# Patient Record
Sex: FEMALE | Race: WHITE | Employment: FULL TIME | URBAN - METROPOLITAN AREA
[De-identification: names, ages, dates, MRNs, and addresses within clinical notes are randomized per-mention and may not be internally consistent; named-entity substitution may affect disease eponyms.]

---

## 2020-12-21 ENCOUNTER — OFFICE VISIT (OUTPATIENT)
Dept: OBGYN CLINIC | Facility: CLINIC | Age: 30
End: 2020-12-21
Payer: COMMERCIAL

## 2020-12-21 VITALS
HEIGHT: 61.5 IN | HEART RATE: 72 BPM | DIASTOLIC BLOOD PRESSURE: 70 MMHG | SYSTOLIC BLOOD PRESSURE: 118 MMHG | BODY MASS INDEX: 29.79 KG/M2 | WEIGHT: 159.81 LBS

## 2020-12-21 DIAGNOSIS — Z01.419 WELL WOMAN EXAM WITH ROUTINE GYNECOLOGICAL EXAM: Primary | ICD-10-CM

## 2020-12-21 DIAGNOSIS — Z30.41 ENCOUNTER FOR SURVEILLANCE OF CONTRACEPTIVE PILLS: ICD-10-CM

## 2020-12-21 DIAGNOSIS — Z12.4 SCREENING FOR MALIGNANT NEOPLASM OF CERVIX: ICD-10-CM

## 2020-12-21 PROCEDURE — 99385 PREV VISIT NEW AGE 18-39: CPT | Performed by: OBSTETRICS & GYNECOLOGY

## 2020-12-21 PROCEDURE — 88175 CYTOPATH C/V AUTO FLUID REDO: CPT | Performed by: OBSTETRICS & GYNECOLOGY

## 2020-12-21 PROCEDURE — 3074F SYST BP LT 130 MM HG: CPT | Performed by: OBSTETRICS & GYNECOLOGY

## 2020-12-21 PROCEDURE — 3008F BODY MASS INDEX DOCD: CPT | Performed by: OBSTETRICS & GYNECOLOGY

## 2020-12-21 PROCEDURE — 3078F DIAST BP <80 MM HG: CPT | Performed by: OBSTETRICS & GYNECOLOGY

## 2020-12-21 RX ORDER — DESOGESTREL AND ETHINYL ESTRADIOL 0.15-0.03
1 KIT ORAL DAILY
COMMUNITY
Start: 2020-12-07 | End: 2020-12-21

## 2020-12-21 RX ORDER — DESOGESTREL AND ETHINYL ESTRADIOL 0.15-0.03
1 KIT ORAL DAILY
Qty: 84 TABLET | Refills: 3 | Status: SHIPPED | OUTPATIENT
Start: 2020-12-21 | End: 2021-09-29

## 2020-12-21 NOTE — PROGRESS NOTES
GYN H&P     2020  11:53 AM    CC: Patient is here for annual exam    HPI: patient is a 35year old  here for her annual exam  She reports she is doing well. She just moved here from [place]. Is a nurse (currently a school nurse).      Delivered a mass, no nipple discharge, no skin change and no tenderness. Left breast exhibits no inverted nipple, no mass, no nipple discharge, no skin change and no tenderness. Breasts are symmetrical.   Abdominal: Soft. Normal appearance.  She exhibits no distension

## 2021-09-01 ENCOUNTER — TELEPHONE (OUTPATIENT)
Dept: OBGYN CLINIC | Facility: CLINIC | Age: 31
End: 2021-09-01

## 2021-09-01 DIAGNOSIS — O20.9 BLEEDING IN EARLY PREGNANCY: Primary | ICD-10-CM

## 2021-09-01 NOTE — TELEPHONE ENCOUNTER
Patient calling to initiate prenatal care  LMP 7/24/21  Patient is 7-8 weeks on   Confirmation Ultrasound and Appointment scheduled on   Future Appointments   Date Time Provider Lona Dorado   9/29/2021  8:15 AM [facility] ERIN VALENCIA [facility] N [facility] [place]

## 2021-09-02 NOTE — TELEPHONE ENCOUNTER
LMP: 21  EDC based on lmp: 22    8 wks on ; appt on  at 9 4/7 wks        Are cycles regular?: yes, 31-35 day cycle    Medical problems: gestational HTN- no problems after delivery    Past sx hx: wisdom teeth removal    Current medica

## 2021-09-28 NOTE — PATIENT INSTRUCTIONS
Foods to Avoid During Pregnancy  • Deli Meats- You may eat deli meats from the deli. If you eat deli meats in the package, it may be contaminated with Listeria.  Heat all deli meats in a package to 165 degrees Fahrenheit before eating, even if the labe Annusol, Sitz bath or Witch hazel  Eat a high fiber diet and drink plenty of fluids. Yeast: Monistat 3 or 7  Call if your symptoms do not improve, or if you experience vaginal bleeding, or a watery discharge.     Constipation: Metamucil, Colace, fiber shafer

## 2021-09-28 NOTE — PROGRESS NOTES
[facility]  Obstetrics and Gynecology    Subjective:     Chana Monroe is a 35year old  female presents with c/o secondary amenorrhea and positive pregnancy test. The patient was recommended to return for further evaluation.  The week to confirm diagnosis.     Reviewed and electronically signed by: Eloisa Kaur MD, 21, 9:23 AM       Assessment:     Tiffany Saavedra is a 35year old  female who presents for secondary amenorrhea and positive pregnancy test    Laretta Runner

## 2021-09-29 ENCOUNTER — OFFICE VISIT (OUTPATIENT)
Dept: OBGYN CLINIC | Facility: CLINIC | Age: 31
End: 2021-09-29
Payer: COMMERCIAL

## 2021-09-29 ENCOUNTER — LAB ENCOUNTER (OUTPATIENT)
Dept: LAB | Age: 31
End: 2021-09-29
Attending: OBSTETRICS & GYNECOLOGY
Payer: COMMERCIAL

## 2021-09-29 ENCOUNTER — ULTRASOUND ENCOUNTER (OUTPATIENT)
Dept: OBGYN CLINIC | Facility: CLINIC | Age: 31
End: 2021-09-29
Payer: COMMERCIAL

## 2021-09-29 VITALS
SYSTOLIC BLOOD PRESSURE: 118 MMHG | BODY MASS INDEX: 29.64 KG/M2 | WEIGHT: 159 LBS | DIASTOLIC BLOOD PRESSURE: 68 MMHG | HEIGHT: 61.5 IN

## 2021-09-29 DIAGNOSIS — N91.2 AMENORRHEA: ICD-10-CM

## 2021-09-29 DIAGNOSIS — Z32.01 PREGNANCY EXAMINATION OR TEST, POSITIVE RESULT: ICD-10-CM

## 2021-09-29 DIAGNOSIS — N91.2 AMENORRHEA: Primary | ICD-10-CM

## 2021-09-29 PROCEDURE — 3074F SYST BP LT 130 MM HG: CPT | Performed by: OBSTETRICS & GYNECOLOGY

## 2021-09-29 PROCEDURE — 85025 COMPLETE CBC W/AUTO DIFF WBC: CPT

## 2021-09-29 PROCEDURE — 86900 BLOOD TYPING SEROLOGIC ABO: CPT

## 2021-09-29 PROCEDURE — 3008F BODY MASS INDEX DOCD: CPT | Performed by: OBSTETRICS & GYNECOLOGY

## 2021-09-29 PROCEDURE — 86850 RBC ANTIBODY SCREEN: CPT

## 2021-09-29 PROCEDURE — 76830 TRANSVAGINAL US NON-OB: CPT | Performed by: OBSTETRICS & GYNECOLOGY

## 2021-09-29 PROCEDURE — 3078F DIAST BP <80 MM HG: CPT | Performed by: OBSTETRICS & GYNECOLOGY

## 2021-09-29 PROCEDURE — 36415 COLL VENOUS BLD VENIPUNCTURE: CPT

## 2021-09-29 PROCEDURE — 76856 US EXAM PELVIC COMPLETE: CPT | Performed by: OBSTETRICS & GYNECOLOGY

## 2021-09-29 PROCEDURE — 86901 BLOOD TYPING SEROLOGIC RH(D): CPT

## 2021-09-29 PROCEDURE — 84702 CHORIONIC GONADOTROPIN TEST: CPT

## 2021-09-29 PROCEDURE — 99213 OFFICE O/P EST LOW 20 MIN: CPT | Performed by: OBSTETRICS & GYNECOLOGY

## 2021-09-29 NOTE — PROGRESS NOTES
A-, antibody screen negative. Therefore, I would advise RhoGam when patient begins vaginal bleeding.

## 2021-10-02 ENCOUNTER — MOBILE ENCOUNTER (OUTPATIENT)
Dept: OBGYN CLINIC | Facility: CLINIC | Age: 31
End: 2021-10-02

## 2021-10-02 NOTE — PROGRESS NOTES
Contacted patient. Patient reports vaginal bleeding. She states bleeding is small in amount but one small clot passed. Patient advised to complete repeat pregnancy reviewed.  Her ultrasound was concerning for possible missed miscarriage and advised to follo

## 2021-10-04 ENCOUNTER — NURSE ONLY (OUTPATIENT)
Dept: OBGYN CLINIC | Facility: CLINIC | Age: 31
End: 2021-10-04
Payer: COMMERCIAL

## 2021-10-04 VITALS
SYSTOLIC BLOOD PRESSURE: 102 MMHG | DIASTOLIC BLOOD PRESSURE: 68 MMHG | WEIGHT: 157 LBS | BODY MASS INDEX: 28.89 KG/M2 | HEIGHT: 62 IN

## 2021-10-04 DIAGNOSIS — O20.9 BLEEDING IN EARLY PREGNANCY: ICD-10-CM

## 2021-10-04 PROCEDURE — 3078F DIAST BP <80 MM HG: CPT | Performed by: OBSTETRICS & GYNECOLOGY

## 2021-10-04 PROCEDURE — 84702 CHORIONIC GONADOTROPIN TEST: CPT | Performed by: OBSTETRICS & GYNECOLOGY

## 2021-10-04 PROCEDURE — 3074F SYST BP LT 130 MM HG: CPT | Performed by: OBSTETRICS & GYNECOLOGY

## 2021-10-04 PROCEDURE — 3008F BODY MASS INDEX DOCD: CPT | Performed by: OBSTETRICS & GYNECOLOGY

## 2021-10-04 PROCEDURE — 96372 THER/PROPH/DIAG INJ SC/IM: CPT | Performed by: OBSTETRICS & GYNECOLOGY

## 2021-10-04 NOTE — TELEPHONE ENCOUNTER
Patient called regarding issuses she is having and spoke with dr Jackee Nyhan over weekend regarding if needs Rhogam     Thank you

## 2021-10-04 NOTE — TELEPHONE ENCOUNTER
Per Dr. Desiree Billy, she wants patient to have HCG done today  Detailed message left notifying patient to have blood work done tomorrow  HCG ordered.

## 2021-10-04 NOTE — TELEPHONE ENCOUNTER
Spoke with patient. She stated she spoke with Dr. Mynor Dorantes over the weekend because she was having very faint pink-brown spotting on 10/02. She was out of town at a wedding. She states she never had any bright red bleeding.  She is no longer having any br

## 2021-10-05 ENCOUNTER — OFFICE VISIT (OUTPATIENT)
Dept: OBGYN CLINIC | Facility: CLINIC | Age: 31
End: 2021-10-05
Payer: COMMERCIAL

## 2021-10-05 ENCOUNTER — TELEPHONE (OUTPATIENT)
Dept: OBGYN CLINIC | Facility: CLINIC | Age: 31
End: 2021-10-05

## 2021-10-05 ENCOUNTER — ULTRASOUND ENCOUNTER (OUTPATIENT)
Dept: OBGYN CLINIC | Facility: CLINIC | Age: 31
End: 2021-10-05
Payer: COMMERCIAL

## 2021-10-05 VITALS
HEART RATE: 80 BPM | WEIGHT: 156.81 LBS | BODY MASS INDEX: 29 KG/M2 | SYSTOLIC BLOOD PRESSURE: 134 MMHG | DIASTOLIC BLOOD PRESSURE: 80 MMHG

## 2021-10-05 DIAGNOSIS — O02.1 MISSED ABORTION: Primary | ICD-10-CM

## 2021-10-05 PROCEDURE — 3079F DIAST BP 80-89 MM HG: CPT | Performed by: OBSTETRICS & GYNECOLOGY

## 2021-10-05 PROCEDURE — 3075F SYST BP GE 130 - 139MM HG: CPT | Performed by: OBSTETRICS & GYNECOLOGY

## 2021-10-05 PROCEDURE — 76830 TRANSVAGINAL US NON-OB: CPT | Performed by: OBSTETRICS & GYNECOLOGY

## 2021-10-05 PROCEDURE — 99215 OFFICE O/P EST HI 40 MIN: CPT | Performed by: OBSTETRICS & GYNECOLOGY

## 2021-10-05 NOTE — TELEPHONE ENCOUNTER
----- Message from Abdoul Urbano MD sent at 10/5/2021  1:34 PM CDT -----  Regarding: please submit request  Surgeon: Dr. Abdoul Urbano   Assistant: none     Type of Admit: Hospital outpatient, does not require admission following surgery   Procedure Loc

## 2021-10-05 NOTE — TELEPHONE ENCOUNTER
Surgery scheduled for 10/8/2021 at 8:30AM  Post op   Future Appointments   Date Time Provider Lona Blackburni   10/27/2021 10:45 AM Alessio Salinas MD [facility] OB[facility] P [facility] [address]     Orders entered  Added to calendar  PA - no auth needed  CPT [id number] and [id number]

## 2021-10-05 NOTE — PATIENT INSTRUCTIONS
Please return if having abnormal vaginal bleeding or severe pelvic pain    You are scheduled for a dilation (opening of the cervix) and suction curettage (removal of products of conception using suction device).      You are scheduled on 10/08/21 at 8:30 am

## 2021-10-05 NOTE — PROGRESS NOTES
[facility]  Obstetrics and Gynecology  Follow Up Progress Note    Subjective:     Laura Lopez is a 35year old  female who was last seen in office 21 for secondary amenorrhea and positive pregnancy test. Her US findings we US   GA by LMP: 9w4d   GA by US: 6w2d   CRL: 0.56 cm   YS: 0.34 cm   FHT: not seen   Impression: IUP. Non-viable versus early viable. Gestational sac, yolk sac and fetal pole seen. No cardiac activity noted.      Ultrasound findings are suggestive of missed time.    Total patient time was 45 minutes in evaluation, consultation, and coordination of care. Greater than 50% of this time was spent in face to face discussion with the patient. The patient had many questions and concerns that were addressed.        P

## 2021-10-06 ENCOUNTER — LAB ENCOUNTER (OUTPATIENT)
Dept: LAB | Age: 31
End: 2021-10-06
Attending: OBSTETRICS & GYNECOLOGY
Payer: COMMERCIAL

## 2021-10-06 DIAGNOSIS — O02.1 MISSED ABORTION: ICD-10-CM

## 2021-10-06 PROCEDURE — 86850 RBC ANTIBODY SCREEN: CPT

## 2021-10-06 PROCEDURE — 86901 BLOOD TYPING SEROLOGIC RH(D): CPT

## 2021-10-06 PROCEDURE — 86870 RBC ANTIBODY IDENTIFICATION: CPT

## 2021-10-06 PROCEDURE — 86900 BLOOD TYPING SEROLOGIC ABO: CPT

## 2021-10-07 ENCOUNTER — ANESTHESIA EVENT (OUTPATIENT)
Dept: SURGERY | Facility: HOSPITAL | Age: 31
End: 2021-10-07
Payer: COMMERCIAL

## 2021-10-08 ENCOUNTER — ANESTHESIA (OUTPATIENT)
Dept: SURGERY | Facility: HOSPITAL | Age: 31
End: 2021-10-08
Payer: COMMERCIAL

## 2021-10-08 ENCOUNTER — HOSPITAL ENCOUNTER (OUTPATIENT)
Facility: HOSPITAL | Age: 31
Setting detail: HOSPITAL OUTPATIENT SURGERY
Discharge: HOME | End: 2021-10-08
Attending: OBSTETRICS & GYNECOLOGY | Admitting: OBSTETRICS & GYNECOLOGY
Payer: COMMERCIAL

## 2021-10-08 ENCOUNTER — APPOINTMENT (OUTPATIENT)
Dept: ULTRASOUND IMAGING | Facility: HOSPITAL | Age: 31
End: 2021-10-08
Attending: OBSTETRICS & GYNECOLOGY
Payer: COMMERCIAL

## 2021-10-08 VITALS
OXYGEN SATURATION: 95 % | TEMPERATURE: 98 F | RESPIRATION RATE: 18 BRPM | BODY MASS INDEX: 28.71 KG/M2 | DIASTOLIC BLOOD PRESSURE: 60 MMHG | HEIGHT: 62 IN | WEIGHT: 156 LBS | HEART RATE: 58 BPM | SYSTOLIC BLOOD PRESSURE: 104 MMHG

## 2021-10-08 DIAGNOSIS — O02.1 MISSED ABORTION: Primary | ICD-10-CM

## 2021-10-08 PROCEDURE — 76998 US GUIDE INTRAOP: CPT | Performed by: OBSTETRICS & GYNECOLOGY

## 2021-10-08 PROCEDURE — 10D17ZZ EXTRACTION OF PRODUCTS OF CONCEPTION, RETAINED, VIA NATURAL OR ARTIFICIAL OPENING: ICD-10-PCS | Performed by: OBSTETRICS & GYNECOLOGY

## 2021-10-08 PROCEDURE — 59820 CARE OF MISCARRIAGE: CPT | Performed by: OBSTETRICS & GYNECOLOGY

## 2021-10-08 RX ORDER — SODIUM CHLORIDE, SODIUM LACTATE, POTASSIUM CHLORIDE, CALCIUM CHLORIDE 600; 310; 30; 20 MG/100ML; MG/100ML; MG/100ML; MG/100ML
INJECTION, SOLUTION INTRAVENOUS CONTINUOUS
Status: DISCONTINUED | OUTPATIENT
Start: 2021-10-08 | End: 2021-10-08

## 2021-10-08 RX ORDER — KETOROLAC TROMETHAMINE 30 MG/ML
INJECTION, SOLUTION INTRAMUSCULAR; INTRAVENOUS AS NEEDED
Status: DISCONTINUED | OUTPATIENT
Start: 2021-10-08 | End: 2021-10-08 | Stop reason: SURG

## 2021-10-08 RX ORDER — HYDROCODONE BITARTRATE AND ACETAMINOPHEN 5; 325 MG/1; MG/1
2 TABLET ORAL AS NEEDED
Status: CANCELLED | OUTPATIENT
Start: 2021-10-08

## 2021-10-08 RX ORDER — ACETAMINOPHEN 500 MG
1000 TABLET ORAL ONCE
Status: DISCONTINUED | OUTPATIENT
Start: 2021-10-08 | End: 2021-10-08 | Stop reason: HOSPADM

## 2021-10-08 RX ORDER — HYDROMORPHONE HYDROCHLORIDE 1 MG/ML
0.4 INJECTION, SOLUTION INTRAMUSCULAR; INTRAVENOUS; SUBCUTANEOUS EVERY 5 MIN PRN
Status: CANCELLED | OUTPATIENT
Start: 2021-10-08 | End: 2021-10-08

## 2021-10-08 RX ORDER — SODIUM CHLORIDE, SODIUM LACTATE, POTASSIUM CHLORIDE, CALCIUM CHLORIDE 600; 310; 30; 20 MG/100ML; MG/100ML; MG/100ML; MG/100ML
INJECTION, SOLUTION INTRAVENOUS CONTINUOUS
Status: CANCELLED | OUTPATIENT
Start: 2021-10-08

## 2021-10-08 RX ORDER — FERRIC SUBSULFATE 20-22G/100
SOLUTION, NON-ORAL MISCELLANEOUS AS NEEDED
Status: DISCONTINUED | OUTPATIENT
Start: 2021-10-08 | End: 2021-10-08

## 2021-10-08 RX ORDER — LIDOCAINE HYDROCHLORIDE 10 MG/ML
INJECTION, SOLUTION EPIDURAL; INFILTRATION; INTRACAUDAL; PERINEURAL AS NEEDED
Status: DISCONTINUED | OUTPATIENT
Start: 2021-10-08 | End: 2021-10-08 | Stop reason: SURG

## 2021-10-08 RX ORDER — NALOXONE HYDROCHLORIDE 0.4 MG/ML
80 INJECTION, SOLUTION INTRAMUSCULAR; INTRAVENOUS; SUBCUTANEOUS AS NEEDED
Status: CANCELLED | OUTPATIENT
Start: 2021-10-08 | End: 2021-10-08

## 2021-10-08 RX ORDER — HYDROCODONE BITARTRATE AND ACETAMINOPHEN 5; 325 MG/1; MG/1
1 TABLET ORAL AS NEEDED
Status: CANCELLED | OUTPATIENT
Start: 2021-10-08

## 2021-10-08 RX ORDER — DEXAMETHASONE SODIUM PHOSPHATE 4 MG/ML
VIAL (ML) INJECTION AS NEEDED
Status: DISCONTINUED | OUTPATIENT
Start: 2021-10-08 | End: 2021-10-08 | Stop reason: SURG

## 2021-10-08 RX ORDER — ACETAMINOPHEN 500 MG
500 TABLET ORAL EVERY 6 HOURS PRN
COMMUNITY
End: 2021-10-22

## 2021-10-08 RX ORDER — IBUPROFEN 600 MG/1
600 TABLET ORAL EVERY 6 HOURS PRN
Qty: 30 TABLET | Refills: 0 | Status: SHIPPED | COMMUNITY
Start: 2021-10-08 | End: 2021-10-22

## 2021-10-08 RX ORDER — ONDANSETRON 2 MG/ML
4 INJECTION INTRAMUSCULAR; INTRAVENOUS AS NEEDED
Status: CANCELLED | OUTPATIENT
Start: 2021-10-08 | End: 2021-10-08

## 2021-10-08 RX ORDER — ACETAMINOPHEN 500 MG
1000 TABLET ORAL ONCE AS NEEDED
Status: CANCELLED | OUTPATIENT
Start: 2021-10-08 | End: 2021-10-08

## 2021-10-08 RX ORDER — MEPERIDINE HYDROCHLORIDE 25 MG/ML
12.5 INJECTION INTRAMUSCULAR; INTRAVENOUS; SUBCUTANEOUS AS NEEDED
Status: CANCELLED | OUTPATIENT
Start: 2021-10-08

## 2021-10-08 RX ORDER — ONDANSETRON 2 MG/ML
INJECTION INTRAMUSCULAR; INTRAVENOUS AS NEEDED
Status: DISCONTINUED | OUTPATIENT
Start: 2021-10-08 | End: 2021-10-08 | Stop reason: SURG

## 2021-10-08 RX ORDER — MIDAZOLAM HYDROCHLORIDE 1 MG/ML
1 INJECTION INTRAMUSCULAR; INTRAVENOUS EVERY 5 MIN PRN
Status: CANCELLED | OUTPATIENT
Start: 2021-10-08 | End: 2021-10-08

## 2021-10-08 RX ADMIN — LIDOCAINE HYDROCHLORIDE 50 MG: 10 INJECTION, SOLUTION EPIDURAL; INFILTRATION; INTRACAUDAL; PERINEURAL at 08:36:00

## 2021-10-08 RX ADMIN — SODIUM CHLORIDE, SODIUM LACTATE, POTASSIUM CHLORIDE, CALCIUM CHLORIDE: 600; 310; 30; 20 INJECTION, SOLUTION INTRAVENOUS at 08:32:00

## 2021-10-08 RX ADMIN — KETOROLAC TROMETHAMINE 30 MG: 30 INJECTION, SOLUTION INTRAMUSCULAR; INTRAVENOUS at 09:06:00

## 2021-10-08 RX ADMIN — ONDANSETRON 4 MG: 2 INJECTION INTRAMUSCULAR; INTRAVENOUS at 08:40:00

## 2021-10-08 RX ADMIN — SODIUM CHLORIDE, SODIUM LACTATE, POTASSIUM CHLORIDE, CALCIUM CHLORIDE: 600; 310; 30; 20 INJECTION, SOLUTION INTRAVENOUS at 09:07:00

## 2021-10-08 RX ADMIN — DEXAMETHASONE SODIUM PHOSPHATE 8 MG: 4 MG/ML VIAL (ML) INJECTION at 08:40:00

## 2021-10-08 NOTE — OPERATIVE REPORT
OPERATIVE REPORT:   DILATATION AND SUCTION CURETTAGE UNDER ULTRASOUND GUIDANCE PROCEDURE NOTE    PATIENT: Laura Lopez  MRN: [id number]  DATE OF PROCEDURE: 10/08/21      INDICATIONS:   35year old  female who presents for dilation with suct draped in normal sterile fashion. The bladder was not emptied. The ultrasound with technician was present at bedside. The uterus with POC was visualized. A sterile speculum was placed in the vagina.  A single tooth tenaculum was used to grasp the anterior l

## 2021-10-08 NOTE — ANESTHESIA POSTPROCEDURE EVALUATION
[address] [place] Patient Status:  Hospital Outpatient Surgery   Age/Gender 35year old female MRN [id number]   [facility] SURGERY Attending Eli Hinojosa MD   Hosp Day # 0 PCP No primary care provider on file.

## 2021-10-08 NOTE — ANESTHESIA PREPROCEDURE EVALUATION
PRE-OP EVALUATION    Patient Name: Brett Rosa    Admit Diagnosis: Missed  [O02.1]    Pre-op Diagnosis: Missed  [O02.1]    DILATION AND CURETTAGE SUCTION    Anesthesia Procedure: DILATION AND CURETTAGE SUCTION (N/A )    Surgeon(s verified and patient meets guidelines. Post-procedure pain management plan discussed with surgeon and patient.     Comment: Spoke with patient and discussed risks of MAC including conversion to GA with ETT, nausea, vomiting, dental injury, cardiac and re

## 2021-10-08 NOTE — H&P
[facility]  Obstetrics and Gynecology  History & Physical    [address] Patient Status:  Hospital Outpatient Surgery    11/3/1990 MRN [id number]   Location [address] [place] [address] Attending Tricia Rock MD   Hospit Never Smoker      Smokeless tobacco: Never Used    Alcohol use: Not Currently      Home Meds: acetaminophen 500 MG Oral Tab, Take 500 mg by mouth every 6 (six) hours as needed for Pain., Disp: , Rfl: , 10/8/2021 at 0415  Prenatal Vit-Fe Fumarate-FA (HARIAT curettage, possible US guidance 2/2 missed  and declined medical management     Patient Active Problem List:     Encounter for surveillance of contraceptive pills     Missed       - Admit for outpatient surgical procedure   - IVF: LR @ 100

## 2021-10-21 PROBLEM — Z30.41 ENCOUNTER FOR SURVEILLANCE OF CONTRACEPTIVE PILLS: Status: RESOLVED | Noted: 2020-12-21 | Resolved: 2021-10-21

## 2021-10-22 ENCOUNTER — TELEPHONE (OUTPATIENT)
Dept: OBGYN UNIT | Facility: HOSPITAL | Age: 31
End: 2021-10-22

## 2021-10-22 ENCOUNTER — OFFICE VISIT (OUTPATIENT)
Dept: OBGYN CLINIC | Facility: CLINIC | Age: 31
End: 2021-10-22
Payer: COMMERCIAL

## 2021-10-22 ENCOUNTER — TELEPHONE (OUTPATIENT)
Dept: OBGYN CLINIC | Facility: CLINIC | Age: 31
End: 2021-10-22

## 2021-10-22 VITALS
DIASTOLIC BLOOD PRESSURE: 74 MMHG | BODY MASS INDEX: 28.49 KG/M2 | SYSTOLIC BLOOD PRESSURE: 112 MMHG | WEIGHT: 154.81 LBS | HEIGHT: 62 IN

## 2021-10-22 DIAGNOSIS — Z98.890 POST-OPERATIVE STATE: Primary | ICD-10-CM

## 2021-10-22 PROBLEM — Z87.59 HISTORY OF ONE MISCARRIAGE: Status: ACTIVE | Noted: 2021-10-05

## 2021-10-22 PROCEDURE — 3008F BODY MASS INDEX DOCD: CPT | Performed by: OBSTETRICS & GYNECOLOGY

## 2021-10-22 PROCEDURE — 3074F SYST BP LT 130 MM HG: CPT | Performed by: OBSTETRICS & GYNECOLOGY

## 2021-10-22 PROCEDURE — 3078F DIAST BP <80 MM HG: CPT | Performed by: OBSTETRICS & GYNECOLOGY

## 2021-10-22 PROCEDURE — 99024 POSTOP FOLLOW-UP VISIT: CPT | Performed by: OBSTETRICS & GYNECOLOGY

## 2021-10-22 NOTE — TELEPHONE ENCOUNTER
Patient was seen in office today. She forgot to ask Dr Naty Douglas about HCG. Per Dr Jigna Castanon she was supposed to track her HCG level down to zero.  Please call to advise

## 2021-10-22 NOTE — PROGRESS NOTES
Subjective:  Patient presents with:  Post-Op: still spotting     Patient presents 2 weeks status post suction D&C by Dr. Amy Cornelius for a missed  . Currently without complaints. Minimalo cramping, minimal bleeding. Denies fever or discharge.     Anola Barthel

## 2021-11-05 ENCOUNTER — LAB ENCOUNTER (OUTPATIENT)
Dept: LAB | Facility: HOSPITAL | Age: 31
End: 2021-11-05
Attending: OBSTETRICS & GYNECOLOGY
Payer: COMMERCIAL

## 2021-11-05 DIAGNOSIS — O02.1 MISSED ABORTION: ICD-10-CM

## 2021-11-05 PROCEDURE — 36415 COLL VENOUS BLD VENIPUNCTURE: CPT

## 2021-11-05 PROCEDURE — 84702 CHORIONIC GONADOTROPIN TEST: CPT

## 2021-11-08 ENCOUNTER — LAB ENCOUNTER (OUTPATIENT)
Dept: LAB | Facility: HOSPITAL | Age: 31
End: 2021-11-08
Attending: OBSTETRICS & GYNECOLOGY
Payer: COMMERCIAL

## 2021-11-08 DIAGNOSIS — O03.9 MISCARRIAGE: ICD-10-CM

## 2021-11-08 PROCEDURE — 84702 CHORIONIC GONADOTROPIN TEST: CPT

## 2021-11-08 PROCEDURE — 36415 COLL VENOUS BLD VENIPUNCTURE: CPT

## 2022-01-24 ENCOUNTER — TELEPHONE (OUTPATIENT)
Dept: OBGYN CLINIC | Facility: CLINIC | Age: 32
End: 2022-01-24

## 2022-01-24 DIAGNOSIS — O09.299 HISTORY OF MISCARRIAGE, CURRENTLY PREGNANT: Primary | ICD-10-CM

## 2022-01-24 NOTE — TELEPHONE ENCOUNTER
LMP: 12/28/21  EDC by lmp: 10/4/22    5 wks on 2/1    Established pt with hx of SAB. hcg and progesterone ordered per protocol for hx of SAB. Contacted patient. Instructed on labs. Bleeding/ER precautions given and questions answered.  Patient states un

## 2022-02-02 ENCOUNTER — LAB ENCOUNTER (OUTPATIENT)
Dept: LAB | Age: 32
End: 2022-02-02
Attending: OBSTETRICS & GYNECOLOGY
Payer: COMMERCIAL

## 2022-02-02 DIAGNOSIS — O09.299 HISTORY OF MISCARRIAGE, CURRENTLY PREGNANT: ICD-10-CM

## 2022-02-02 LAB
B-HCG SERPL-ACNC: 3882 MIU/ML
PROGEST SERPL-MCNC: 14 NG/ML

## 2022-02-02 PROCEDURE — 36415 COLL VENOUS BLD VENIPUNCTURE: CPT

## 2022-02-02 PROCEDURE — 84702 CHORIONIC GONADOTROPIN TEST: CPT

## 2022-02-02 PROCEDURE — 84144 ASSAY OF PROGESTERONE: CPT

## 2022-02-04 ENCOUNTER — LAB ENCOUNTER (OUTPATIENT)
Dept: LAB | Age: 32
End: 2022-02-04
Attending: OBSTETRICS & GYNECOLOGY
Payer: COMMERCIAL

## 2022-02-04 DIAGNOSIS — O09.299 HISTORY OF MISCARRIAGE, CURRENTLY PREGNANT: ICD-10-CM

## 2022-02-04 LAB — B-HCG SERPL-ACNC: 6069 MIU/ML

## 2022-02-04 PROCEDURE — 36415 COLL VENOUS BLD VENIPUNCTURE: CPT

## 2022-02-04 PROCEDURE — 84702 CHORIONIC GONADOTROPIN TEST: CPT

## 2022-02-06 ENCOUNTER — PATIENT MESSAGE (OUTPATIENT)
Dept: OBGYN CLINIC | Facility: CLINIC | Age: 32
End: 2022-02-06

## 2022-02-07 ENCOUNTER — HOSPITAL ENCOUNTER (EMERGENCY)
Age: 32
Discharge: HOME | End: 2022-02-07
Attending: EMERGENCY MEDICINE
Payer: COMMERCIAL

## 2022-02-07 ENCOUNTER — APPOINTMENT (OUTPATIENT)
Dept: ULTRASOUND IMAGING | Age: 32
End: 2022-02-07
Attending: PHYSICIAN ASSISTANT
Payer: COMMERCIAL

## 2022-02-07 ENCOUNTER — TELEPHONE (OUTPATIENT)
Dept: OBGYN CLINIC | Facility: CLINIC | Age: 32
End: 2022-02-07

## 2022-02-07 VITALS
TEMPERATURE: 99 F | WEIGHT: 155 LBS | OXYGEN SATURATION: 100 % | DIASTOLIC BLOOD PRESSURE: 74 MMHG | HEART RATE: 68 BPM | SYSTOLIC BLOOD PRESSURE: 115 MMHG | BODY MASS INDEX: 28.52 KG/M2 | RESPIRATION RATE: 18 BRPM | HEIGHT: 62 IN

## 2022-02-07 DIAGNOSIS — O41.8X10 SUBCHORIONIC HEMORRHAGE OF PLACENTA IN FIRST TRIMESTER, SINGLE OR UNSPECIFIED FETUS: Primary | ICD-10-CM

## 2022-02-07 DIAGNOSIS — O46.8X1 SUBCHORIONIC HEMORRHAGE OF PLACENTA IN FIRST TRIMESTER, SINGLE OR UNSPECIFIED FETUS: Primary | ICD-10-CM

## 2022-02-07 LAB
ALBUMIN SERPL-MCNC: 4.3 G/DL (ref 3.4–5)
ALBUMIN/GLOB SERPL: 1.3 {RATIO} (ref 1–2)
ALP LIVER SERPL-CCNC: 42 U/L
ALT SERPL-CCNC: 19 U/L
ANION GAP SERPL CALC-SCNC: 6 MMOL/L (ref 0–18)
ANTIBODY SCREEN: NEGATIVE
AST SERPL-CCNC: 10 U/L (ref 15–37)
B-HCG SERPL-ACNC: ABNORMAL MIU/ML
BASOPHILS # BLD AUTO: 0.07 X10(3) UL (ref 0–0.2)
BASOPHILS NFR BLD AUTO: 0.6 %
BILIRUB SERPL-MCNC: 0.4 MG/DL (ref 0.1–2)
BUN BLD-MCNC: 9 MG/DL (ref 7–18)
CALCIUM BLD-MCNC: 9.2 MG/DL (ref 8.5–10.1)
CHLORIDE SERPL-SCNC: 108 MMOL/L (ref 98–112)
CLARITY UR REFRACT.AUTO: CLEAR
CO2 SERPL-SCNC: 24 MMOL/L (ref 21–32)
COLOR UR AUTO: YELLOW
CREAT BLD-MCNC: 0.8 MG/DL
EOSINOPHIL # BLD AUTO: 0.22 X10(3) UL (ref 0–0.7)
EOSINOPHIL NFR BLD AUTO: 2 %
ERYTHROCYTE [DISTWIDTH] IN BLOOD BY AUTOMATED COUNT: 13.4 %
GLOBULIN PLAS-MCNC: 3.4 G/DL (ref 2.8–4.4)
GLUCOSE BLD-MCNC: 98 MG/DL (ref 70–99)
GLUCOSE UR STRIP.AUTO-MCNC: NEGATIVE MG/DL
HCT VFR BLD AUTO: 42 %
IMM GRANULOCYTES # BLD AUTO: 0.03 X10(3) UL (ref 0–1)
KETONES UR STRIP.AUTO-MCNC: NEGATIVE MG/DL
LEUKOCYTE ESTERASE UR QL STRIP.AUTO: NEGATIVE
LYMPHOCYTES # BLD AUTO: 3.07 X10(3) UL (ref 1–4)
LYMPHOCYTES NFR BLD AUTO: 28.3 %
MCH RBC QN AUTO: 29.5 PG (ref 26–34)
MCV RBC AUTO: 89.7 FL
MONOCYTES # BLD AUTO: 0.54 X10(3) UL (ref 0.1–1)
MONOCYTES NFR BLD AUTO: 5 %
NEUTROPHILS # BLD AUTO: 6.91 X10 (3) UL (ref 1.5–7.7)
NEUTROPHILS # BLD AUTO: 6.91 X10(3) UL (ref 1.5–7.7)
NEUTROPHILS NFR BLD AUTO: 63.8 %
NITRITE UR QL STRIP.AUTO: NEGATIVE
OSMOLALITY SERPL CALC.SUM OF ELEC: 285 MOSM/KG (ref 275–295)
PH UR STRIP.AUTO: 7 [PH] (ref 5–8)
PLATELET # BLD AUTO: 327 10(3)UL (ref 150–450)
POTASSIUM SERPL-SCNC: 4.1 MMOL/L (ref 3.5–5.1)
PROGEST SERPL-MCNC: 14 NG/ML
PROT SERPL-MCNC: 7.7 G/DL (ref 6.4–8.2)
PROT UR STRIP.AUTO-MCNC: NEGATIVE MG/DL
RBC # BLD AUTO: 4.68 X10(6)UL
RBC UR QL AUTO: NEGATIVE
RH BLOOD TYPE: NEGATIVE
SODIUM SERPL-SCNC: 138 MMOL/L (ref 136–145)
SP GR UR STRIP.AUTO: 1.02 (ref 1–1.03)
UROBILINOGEN UR STRIP.AUTO-MCNC: 0.2 MG/DL
WBC # BLD AUTO: 10.8 X10(3) UL (ref 4–11)

## 2022-02-07 PROCEDURE — 80053 COMPREHEN METABOLIC PANEL: CPT | Performed by: PHYSICIAN ASSISTANT

## 2022-02-07 PROCEDURE — 99284 EMERGENCY DEPT VISIT MOD MDM: CPT

## 2022-02-07 PROCEDURE — 84702 CHORIONIC GONADOTROPIN TEST: CPT | Performed by: PHYSICIAN ASSISTANT

## 2022-02-07 PROCEDURE — 76817 TRANSVAGINAL US OBSTETRIC: CPT | Performed by: PHYSICIAN ASSISTANT

## 2022-02-07 PROCEDURE — 86900 BLOOD TYPING SEROLOGIC ABO: CPT | Performed by: PHYSICIAN ASSISTANT

## 2022-02-07 PROCEDURE — 86850 RBC ANTIBODY SCREEN: CPT | Performed by: PHYSICIAN ASSISTANT

## 2022-02-07 PROCEDURE — 84144 ASSAY OF PROGESTERONE: CPT | Performed by: EMERGENCY MEDICINE

## 2022-02-07 PROCEDURE — 96372 THER/PROPH/DIAG INJ SC/IM: CPT

## 2022-02-07 PROCEDURE — 85025 COMPLETE CBC W/AUTO DIFF WBC: CPT | Performed by: PHYSICIAN ASSISTANT

## 2022-02-07 PROCEDURE — 76801 OB US < 14 WKS SINGLE FETUS: CPT | Performed by: PHYSICIAN ASSISTANT

## 2022-02-07 PROCEDURE — 81003 URINALYSIS AUTO W/O SCOPE: CPT | Performed by: PHYSICIAN ASSISTANT

## 2022-02-07 PROCEDURE — 86901 BLOOD TYPING SEROLOGIC RH(D): CPT | Performed by: PHYSICIAN ASSISTANT

## 2022-02-07 NOTE — TELEPHONE ENCOUNTER
LMP: 21 GA: 5 6/7 wks by lmp    Early ob labs done for hx of SAB  22:   hcg: 3,882.0  Progesterone: 14.00    22:  hc,069.0      scheduled for 22    Call to patient; no answer. Left message to call back.

## 2022-02-07 NOTE — TELEPHONE ENCOUNTER
Returned call to patient. She reports spotting last night. Was light pink in color and was experiencing cramping also. Feels the quality of cramping is different and she thinks it may be more consistent today as well. Feels cramping on left side more than anywhere else. Last intercourse was a week ago. Encouraged patient to proceed to ER for further evaluation. Will f/u tomorrow for next steps. Patient tearful on phone. Emotional support offered. She agrees with plan.

## 2022-02-08 NOTE — TELEPHONE ENCOUNTER
Per Dr. Prince Brower, ok to keep next appt. Will give precautions. Contacted patient. No bleeding today. Advised on bleeding/ER precautions. To keep next appt. Questions answered and patient states understanding. She reports feeling reassured and agrees with plan.

## 2022-02-08 NOTE — TELEPHONE ENCOUNTER
Patient seen in ER. Ultrasound in ER with EDWARD REVELES seen. Subchorionic hemorrhage noted, measuring 9 x 3 x 5 mm  5 6/7 weeks by ultrasound     Attempted to reach patient by phone. No answer. Let her know in VM that message was being sent to on call MD to review and to advise if she needs to be seen sooner than  scheduled for 2/18/22. To call back if she needs something sooner than return call from the office.     Routed to Dr. Brittanie Napier

## 2022-02-08 NOTE — TELEPHONE ENCOUNTER
From: Elvis Garcia  To: Author MD Sherice  Sent: 2/6/2022 5:54 PM CST  Subject: Question regarding HCG, BETA SUBUNIT (QUANT PREGNANCY TEST)    Dr. Brandi Florez,     Thank you so much for following up on the labs. I did start light spotting just now (around 5:30 pm on 2/6) and based on our recent loss I am panicking. What do we do now?

## 2022-02-08 NOTE — ED PROVIDER NOTES
Case discussed with me by SHAGGY chowdhury same. Patient is 6 weeks pregnant with left lower quadrant cramping and spotting. Rh-. Will require RhoGam.  Await ultrasound report and hormone level to determine follow-up. Otherwise agree with the treatment plan.

## 2022-02-08 NOTE — ED INITIAL ASSESSMENT (HPI)
Pt came in w/ c/o LLQ pain that started last night with small amounts of blood that took place last night. Pt denies spotting today. Pt denies any other complaints.  Pt states had previous miscarriage with [facility] 10/2021

## 2022-02-18 ENCOUNTER — OFFICE VISIT (OUTPATIENT)
Dept: OBGYN CLINIC | Facility: CLINIC | Age: 32
End: 2022-02-18
Payer: COMMERCIAL

## 2022-02-18 ENCOUNTER — APPOINTMENT (OUTPATIENT)
Dept: OBGYN CLINIC | Facility: CLINIC | Age: 32
End: 2022-02-18
Payer: COMMERCIAL

## 2022-02-18 VITALS — DIASTOLIC BLOOD PRESSURE: 70 MMHG | WEIGHT: 157 LBS | SYSTOLIC BLOOD PRESSURE: 110 MMHG | BODY MASS INDEX: 29 KG/M2

## 2022-02-18 DIAGNOSIS — N91.1 SECONDARY AMENORRHEA: Primary | ICD-10-CM

## 2022-02-18 PROCEDURE — 99212 OFFICE O/P EST SF 10 MIN: CPT | Performed by: OBSTETRICS & GYNECOLOGY

## 2022-02-18 PROCEDURE — 76830 TRANSVAGINAL US NON-OB: CPT | Performed by: OBSTETRICS & GYNECOLOGY

## 2022-02-18 PROCEDURE — 3078F DIAST BP <80 MM HG: CPT | Performed by: OBSTETRICS & GYNECOLOGY

## 2022-02-18 PROCEDURE — 3074F SYST BP LT 130 MM HG: CPT | Performed by: OBSTETRICS & GYNECOLOGY

## 2022-03-23 ENCOUNTER — INITIAL PRENATAL (OUTPATIENT)
Dept: OBGYN CLINIC | Facility: CLINIC | Age: 32
End: 2022-03-23
Payer: COMMERCIAL

## 2022-03-23 VITALS
WEIGHT: 153 LBS | SYSTOLIC BLOOD PRESSURE: 92 MMHG | DIASTOLIC BLOOD PRESSURE: 60 MMHG | BODY MASS INDEX: 28.16 KG/M2 | HEIGHT: 62 IN

## 2022-03-23 DIAGNOSIS — Z87.59 HISTORY OF GESTATIONAL HYPERTENSION: ICD-10-CM

## 2022-03-23 DIAGNOSIS — Z34.81 ENCOUNTER FOR SUPERVISION OF OTHER NORMAL PREGNANCY IN FIRST TRIMESTER: Primary | ICD-10-CM

## 2022-03-23 DIAGNOSIS — Z13.79 GENETIC SCREENING: ICD-10-CM

## 2022-03-23 LAB
GLUCOSE (URINE DIPSTICK): NEGATIVE MG/DL
MULTISTIX LOT#: NORMAL NUMERIC

## 2022-03-23 PROCEDURE — 3074F SYST BP LT 130 MM HG: CPT | Performed by: NURSE PRACTITIONER

## 2022-03-23 PROCEDURE — 81002 URINALYSIS NONAUTO W/O SCOPE: CPT | Performed by: NURSE PRACTITIONER

## 2022-03-23 PROCEDURE — 87491 CHLMYD TRACH DNA AMP PROBE: CPT | Performed by: NURSE PRACTITIONER

## 2022-03-23 PROCEDURE — 87086 URINE CULTURE/COLONY COUNT: CPT | Performed by: NURSE PRACTITIONER

## 2022-03-23 PROCEDURE — 3008F BODY MASS INDEX DOCD: CPT | Performed by: NURSE PRACTITIONER

## 2022-03-23 PROCEDURE — 87591 N.GONORRHOEAE DNA AMP PROB: CPT | Performed by: NURSE PRACTITIONER

## 2022-03-23 PROCEDURE — 3078F DIAST BP <80 MM HG: CPT | Performed by: NURSE PRACTITIONER

## 2022-03-23 NOTE — PROGRESS NOTES
Here for initial prenatal visit with our group. 28year old  at 12w1d by LMP. Patient's last menstrual period was 2021. Last pap smear was 2020 and it was normal.    Previous pregnancy was complicated by Gestational Hypertension at 38 weeks so she was IOL,  episiotomy, failed vacuum. She also had a miscarriage with D&C last year. OB History    Para Term  AB Living   3 1 1   1 1   SAB IAB Ectopic Multiple Live Births   1       1      # Outcome Date GA Lbr Dyllan/2nd Weight Sex Delivery Anes PTL Lv   3 Current            2 SAB 10/08/21     SAB      1 Term 19 38w0d  6 lb 3.2 oz (2.812 kg) F NORMAL SPONT EPI N ROSITA       ROS:  Reviewed, all wnl    Please see prenatal physical exam tab for initial OB physical exam  US: please see US tab    Prenatal course and care discussed with patient: visits, labs, HIV, AFP, sonograms, GL, GBS, restrictions. Pamphlets given. Genetic counseling done. Cystic fibrosis carrier screen, First trimester screen/NT screen, CVS, QS + midtrimester sonogram for markers, amniocentesis. Pt is a negative carrier for SMA and CF. She is contemplating the MaterniT 21 or First trimester screen. Discussed time restraints with the First Trimester screen. A/P:  1. Encounter for supervision of other normal pregnancy in first trimester  - CHLAMYDIA/GONOCOCCUS, YULI; Future  - PRENATAL SCREEN; Future  - CBC WITH DIFFERENTIAL WITH PLATELET; Future  - HEPATITIS B SURFACE ANTIGEN; Future  - T PALLIDUM SCREENING CASCADE; Future  - RUBELLA, IGG; Future  - HIV AG AB COMBO; Future  - URINE CULTURE, ROUTINE; Future    2. Genetic screening  - MATERNIT-21 [place] KIT COLLECTION; Future    3.  History of gestational hypertension  Discussed possible use of BASA 81 mg daily- strong indication with PIH but soft recommendations with GHTN    GILMAR 4 weeks/ prn

## 2022-03-24 LAB
C TRACH DNA SPEC QL NAA+PROBE: NEGATIVE
N GONORRHOEA DNA SPEC QL NAA+PROBE: NEGATIVE

## 2022-04-05 ENCOUNTER — LAB ENCOUNTER (OUTPATIENT)
Dept: LAB | Age: 32
End: 2022-04-05
Attending: NURSE PRACTITIONER
Payer: COMMERCIAL

## 2022-04-05 DIAGNOSIS — Z34.81 ENCOUNTER FOR SUPERVISION OF OTHER NORMAL PREGNANCY IN FIRST TRIMESTER: ICD-10-CM

## 2022-04-05 LAB
ANTIBODY SCREEN: POSITIVE
BASOPHILS # BLD AUTO: 0.05 X10(3) UL (ref 0–0.2)
BASOPHILS NFR BLD AUTO: 0.5 %
EOSINOPHIL # BLD AUTO: 0.28 X10(3) UL (ref 0–0.7)
EOSINOPHIL NFR BLD AUTO: 2.9 %
ERYTHROCYTE [DISTWIDTH] IN BLOOD BY AUTOMATED COUNT: 13.4 %
HBV SURFACE AG SER-ACNC: <0.1 [IU]/L
HBV SURFACE AG SERPL QL IA: NONREACTIVE
HCT VFR BLD AUTO: 37.4 %
HGB BLD-MCNC: 12 G/DL
IMM GRANULOCYTES # BLD AUTO: 0.03 X10(3) UL (ref 0–1)
IMM GRANULOCYTES NFR BLD: 0.3 %
LYMPHOCYTES # BLD AUTO: 2.53 X10(3) UL (ref 1–4)
LYMPHOCYTES NFR BLD AUTO: 25.8 %
MCH RBC QN AUTO: 28.8 PG (ref 26–34)
MCHC RBC AUTO-ENTMCNC: 32.1 G/DL (ref 31–37)
MCV RBC AUTO: 89.7 FL
MONOCYTES # BLD AUTO: 0.47 X10(3) UL (ref 0.1–1)
MONOCYTES NFR BLD AUTO: 4.8 %
NEUTROPHILS # BLD AUTO: 6.45 X10 (3) UL (ref 1.5–7.7)
NEUTROPHILS # BLD AUTO: 6.45 X10(3) UL (ref 1.5–7.7)
NEUTROPHILS NFR BLD AUTO: 65.7 %
PLATELET # BLD AUTO: 257 10(3)UL (ref 150–450)
RBC # BLD AUTO: 4.17 X10(6)UL
RH BLOOD TYPE: NEGATIVE
RUBV IGG SER QL: POSITIVE
RUBV IGG SER-ACNC: 46.3 IU/ML (ref 10–?)
T PALLIDUM AB SER QL IA: NONREACTIVE
WBC # BLD AUTO: 9.8 X10(3) UL (ref 4–11)

## 2022-04-05 PROCEDURE — 86870 RBC ANTIBODY IDENTIFICATION: CPT

## 2022-04-05 PROCEDURE — 86850 RBC ANTIBODY SCREEN: CPT

## 2022-04-05 PROCEDURE — 87389 HIV-1 AG W/HIV-1&-2 AB AG IA: CPT

## 2022-04-05 PROCEDURE — 85025 COMPLETE CBC W/AUTO DIFF WBC: CPT

## 2022-04-05 PROCEDURE — 86780 TREPONEMA PALLIDUM: CPT

## 2022-04-05 PROCEDURE — 86762 RUBELLA ANTIBODY: CPT

## 2022-04-05 PROCEDURE — 86901 BLOOD TYPING SEROLOGIC RH(D): CPT

## 2022-04-05 PROCEDURE — 36415 COLL VENOUS BLD VENIPUNCTURE: CPT

## 2022-04-05 PROCEDURE — 87340 HEPATITIS B SURFACE AG IA: CPT

## 2022-04-05 PROCEDURE — 86900 BLOOD TYPING SEROLOGIC ABO: CPT

## 2022-04-17 PROBLEM — Z67.91 RH NEGATIVE, ANTEPARTUM: Status: ACTIVE | Noted: 2022-04-17

## 2022-04-17 PROBLEM — O26.899 RH NEGATIVE, ANTEPARTUM (HCC): Status: ACTIVE | Noted: 2022-04-17

## 2022-04-17 PROBLEM — O26.899 RH NEGATIVE, ANTEPARTUM: Status: ACTIVE | Noted: 2022-04-17

## 2022-04-17 PROBLEM — Z67.91 RH NEGATIVE, ANTEPARTUM (HCC): Status: ACTIVE | Noted: 2022-04-17

## 2022-04-18 ENCOUNTER — ROUTINE PRENATAL (OUTPATIENT)
Dept: OBGYN CLINIC | Facility: CLINIC | Age: 32
End: 2022-04-18
Payer: COMMERCIAL

## 2022-04-18 VITALS — DIASTOLIC BLOOD PRESSURE: 72 MMHG | BODY MASS INDEX: 28 KG/M2 | SYSTOLIC BLOOD PRESSURE: 116 MMHG | WEIGHT: 155 LBS

## 2022-04-18 DIAGNOSIS — Z36.9 ANTENATAL SCREENING ENCOUNTER: Primary | ICD-10-CM

## 2022-04-18 DIAGNOSIS — Z3A.15 15 WEEKS GESTATION OF PREGNANCY: ICD-10-CM

## 2022-04-18 LAB
GLUCOSE (URINE DIPSTICK): NEGATIVE MG/DL
MULTISTIX LOT#: NORMAL NUMERIC
PROTEIN (URINE DIPSTICK): NEGATIVE MG/DL

## 2022-04-18 PROCEDURE — 3074F SYST BP LT 130 MM HG: CPT | Performed by: OBSTETRICS & GYNECOLOGY

## 2022-04-18 PROCEDURE — 3078F DIAST BP <80 MM HG: CPT | Performed by: OBSTETRICS & GYNECOLOGY

## 2022-04-18 PROCEDURE — 81002 URINALYSIS NONAUTO W/O SCOPE: CPT | Performed by: OBSTETRICS & GYNECOLOGY

## 2022-04-18 RX ORDER — ASPIRIN 81 MG/1
TABLET ORAL
COMMUNITY
Start: 2022-03-23

## 2022-04-18 NOTE — PROGRESS NOTES
Patient presents with:  Prenatal Care: GILMAR    Routine prenatal visit without complaints. Patient denies any bleeding, leaking fluid, cramping, or contractions. Assessment/Plan:  15w6d doing well  Desires Quad. Declines all other optional prenatal screening tests including cfdna, NT, genetic carrier screening, Level 2 ultrasound, amnio/CVS.   Reviewed signs and symptoms of  labor  Diagnoses and all orders for this visit:     screening encounter  -     AFP, TETRA (QUAD); Future       Return in about 4 weeks (around 2022) for Routine Prenatal Visit, Ultrasound.

## 2022-04-20 ENCOUNTER — LAB ENCOUNTER (OUTPATIENT)
Dept: LAB | Age: 32
End: 2022-04-20
Attending: OBSTETRICS & GYNECOLOGY
Payer: COMMERCIAL

## 2022-04-20 DIAGNOSIS — Z36.9 ANTENATAL SCREENING ENCOUNTER: ICD-10-CM

## 2022-04-20 PROCEDURE — 81511 FTL CGEN ABNOR FOUR ANAL: CPT

## 2022-04-20 PROCEDURE — 36415 COLL VENOUS BLD VENIPUNCTURE: CPT

## 2022-04-22 LAB
AFP SMOKING: NO
FAMILY HISTORY OF ANEUPLOIDY: NO
FAMILY HX NEURAL TUBE DEFECT: NO
INSULIN REQ MATERNAL DIABETES: NO
MATERNAL AGE OF DELIVERY: 30.9 YR
MOM FOR AFP: 0.76
MOM FOR DIA: 0.74
MOM FOR HCG: 0.93
MOM FOR UE3: 0.93
PATIENT'S AFP: 26 NG/ML
PATIENT'S DIA: 117 PG/ML
PATIENT'S HCG: NORMAL IU/L
PATIENT'S UE3: 0.92 NG/ML

## 2022-05-18 ENCOUNTER — ROUTINE PRENATAL (OUTPATIENT)
Dept: OBGYN CLINIC | Facility: CLINIC | Age: 32
End: 2022-05-18
Payer: COMMERCIAL

## 2022-05-18 ENCOUNTER — ULTRASOUND ENCOUNTER (OUTPATIENT)
Dept: OBGYN CLINIC | Facility: CLINIC | Age: 32
End: 2022-05-18
Payer: COMMERCIAL

## 2022-05-18 VITALS — SYSTOLIC BLOOD PRESSURE: 100 MMHG | BODY MASS INDEX: 29 KG/M2 | DIASTOLIC BLOOD PRESSURE: 60 MMHG | WEIGHT: 159.81 LBS

## 2022-05-18 DIAGNOSIS — R10.2 PELVIC PAIN AFFECTING PREGNANCY, ANTEPARTUM: ICD-10-CM

## 2022-05-18 DIAGNOSIS — O26.899 RH NEGATIVE STATE IN ANTEPARTUM PERIOD: ICD-10-CM

## 2022-05-18 DIAGNOSIS — Z34.80 SUPERVISION OF OTHER NORMAL PREGNANCY, ANTEPARTUM: Primary | ICD-10-CM

## 2022-05-18 DIAGNOSIS — Z67.91 RH NEGATIVE, ANTEPARTUM: ICD-10-CM

## 2022-05-18 DIAGNOSIS — O26.899 RH NEGATIVE, ANTEPARTUM: ICD-10-CM

## 2022-05-18 DIAGNOSIS — Z34.90 PRENATAL CARE, ANTEPARTUM: ICD-10-CM

## 2022-05-18 DIAGNOSIS — Z87.59 HISTORY OF GESTATIONAL HYPERTENSION: ICD-10-CM

## 2022-05-18 DIAGNOSIS — Z67.91 RH NEGATIVE STATE IN ANTEPARTUM PERIOD: ICD-10-CM

## 2022-05-18 DIAGNOSIS — O26.899 PELVIC PAIN AFFECTING PREGNANCY, ANTEPARTUM: ICD-10-CM

## 2022-05-18 LAB
GLUCOSE (URINE DIPSTICK): NEGATIVE MG/DL
MULTISTIX LOT#: NORMAL NUMERIC

## 2022-05-18 PROCEDURE — 3074F SYST BP LT 130 MM HG: CPT | Performed by: OBSTETRICS & GYNECOLOGY

## 2022-05-18 PROCEDURE — 76805 OB US >/= 14 WKS SNGL FETUS: CPT | Performed by: OBSTETRICS & GYNECOLOGY

## 2022-05-18 PROCEDURE — 81002 URINALYSIS NONAUTO W/O SCOPE: CPT | Performed by: OBSTETRICS & GYNECOLOGY

## 2022-05-18 PROCEDURE — 3078F DIAST BP <80 MM HG: CPT | Performed by: OBSTETRICS & GYNECOLOGY

## 2022-05-18 NOTE — PROGRESS NOTES
GILMAR  [id number]  GA: 20w1d   05/18/22  1507   BP: 100/60   Weight: 159 lb 12.8 oz (72.5 kg)       Doing well  No complaints. Denies LOF/VB/uctx  RH neg  Genetic testing Quad screen neg 04/20/22   Anatomy Scan 05/18/22 unremarkable   CBC and 1 hr GTT order and instructions provided  Pelvic floor PT secondary to pelvic pain associate with history of pubic symphysis tear    Problem List Items Addressed This Visit        Obstetrical    Rh negative state in antepartum period    Supervision of other normal pregnancy, antepartum - Primary    Relevant Orders    OB/GYNE ULTRASOUND SCAN (Completed)    OB/GYNE ULTRASOUND REPORT (Completed)    US OB COMPLETE 2ND TRIMESTER >14 WKS [facility] ONLY [id number] (Completed)    CBC (WITH DIFF, PLATELET) REFLEX TO FERRITIN    GLUCOSE 1HR OB       Cardiovascular    History of gestational hypertension      Other Visit Diagnoses     Prenatal care, antepartum        Relevant Orders    URINALYSIS NONAUTO W/O SCOPE (OB URISTIX) (Completed)            RTC in 4 wks         Note to patient and family   The [facility] makes medical notes available to patients in the interest of transparency. However, please be advised that this is a medical document. It is intended as ryet-sy-cvts communication. It is written and medical language may contain abbreviations or verbiage that are technical and unfamiliar. It may appear blunt or direct. Medical documents are intended to carry relevant information, facts as evident, and the clinical opinion of the practitioner.

## 2022-05-20 PROBLEM — O26.899 PELVIC PAIN AFFECTING PREGNANCY, ANTEPARTUM: Status: ACTIVE | Noted: 2022-04-17

## 2022-05-20 PROBLEM — R10.2 PELVIC PAIN AFFECTING PREGNANCY, ANTEPARTUM (HCC): Status: ACTIVE | Noted: 2022-04-17

## 2022-05-20 PROBLEM — R10.2 PELVIC PAIN AFFECTING PREGNANCY (HCC): Status: ACTIVE | Noted: 2022-05-20

## 2022-05-20 PROBLEM — O26.899 PELVIC PAIN AFFECTING PREGNANCY (HCC): Status: ACTIVE | Noted: 2022-05-20

## 2022-05-20 PROBLEM — R10.2 PELVIC PAIN AFFECTING PREGNANCY, ANTEPARTUM: Status: ACTIVE | Noted: 2022-04-17

## 2022-05-20 PROBLEM — O26.899 PELVIC PAIN AFFECTING PREGNANCY, ANTEPARTUM (HCC): Status: ACTIVE | Noted: 2022-04-17

## 2022-05-20 PROBLEM — O26.899 PELVIC PAIN AFFECTING PREGNANCY: Status: ACTIVE | Noted: 2022-05-20

## 2022-05-20 PROBLEM — Z67.91 RH NEGATIVE STATE IN ANTEPARTUM PERIOD: Status: ACTIVE | Noted: 2022-04-17

## 2022-05-20 PROBLEM — O26.899 RH NEGATIVE STATE IN ANTEPARTUM PERIOD: Status: ACTIVE | Noted: 2022-04-17

## 2022-05-20 PROBLEM — Z67.91 RH NEGATIVE STATE IN ANTEPARTUM PERIOD (HCC): Status: ACTIVE | Noted: 2022-04-17

## 2022-05-20 PROBLEM — R10.2 PELVIC PAIN AFFECTING PREGNANCY: Status: ACTIVE | Noted: 2022-05-20

## 2022-05-20 PROBLEM — O26.899 RH NEGATIVE STATE IN ANTEPARTUM PERIOD (HCC): Status: ACTIVE | Noted: 2022-04-17

## 2022-05-25 LAB
AMB EXT COVID-19 RESULT: NOT DETECTED
AMB EXT COVID-19 RESULT: NOT DETECTED

## 2022-05-26 ENCOUNTER — APPOINTMENT (OUTPATIENT)
Dept: GENERAL RADIOLOGY | Age: 32
End: 2022-05-26
Attending: EMERGENCY MEDICINE
Payer: COMMERCIAL

## 2022-05-26 ENCOUNTER — HOSPITAL ENCOUNTER (EMERGENCY)
Age: 32
Discharge: HOME | End: 2022-05-26
Attending: EMERGENCY MEDICINE
Payer: COMMERCIAL

## 2022-05-26 ENCOUNTER — APPOINTMENT (OUTPATIENT)
Dept: ULTRASOUND IMAGING | Age: 32
End: 2022-05-26
Attending: EMERGENCY MEDICINE
Payer: COMMERCIAL

## 2022-05-26 VITALS
DIASTOLIC BLOOD PRESSURE: 46 MMHG | BODY MASS INDEX: 28.71 KG/M2 | HEART RATE: 78 BPM | RESPIRATION RATE: 16 BRPM | SYSTOLIC BLOOD PRESSURE: 96 MMHG | OXYGEN SATURATION: 99 % | TEMPERATURE: 98 F | WEIGHT: 156 LBS | HEIGHT: 62 IN

## 2022-05-26 DIAGNOSIS — Z3A.21 21 WEEKS GESTATION OF PREGNANCY: ICD-10-CM

## 2022-05-26 DIAGNOSIS — S60.211A CONTUSION OF RIGHT WRIST, INITIAL ENCOUNTER: ICD-10-CM
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
## 2022-05-26 LAB
ANTIBODY SCREEN: NEGATIVE
BILIRUB UR QL STRIP.AUTO: NEGATIVE
CLARITY UR REFRACT.AUTO: CLEAR
COLOR UR AUTO: YELLOW
GLUCOSE UR STRIP.AUTO-MCNC: NEGATIVE MG/DL
KETONES UR STRIP.AUTO-MCNC: NEGATIVE MG/DL
LEUKOCYTE ESTERASE UR QL STRIP.AUTO: NEGATIVE
NITRITE UR QL STRIP.AUTO: NEGATIVE
PH UR STRIP.AUTO: 7 [PH] (ref 5–8)
PROT UR STRIP.AUTO-MCNC: NEGATIVE MG/DL
RBC UR QL AUTO: NEGATIVE
RH BLOOD TYPE: NEGATIVE
SP GR UR STRIP.AUTO: 1.01 (ref 1–1.03)
UROBILINOGEN UR STRIP.AUTO-MCNC: 0.2 MG/DL

## 2022-05-26 PROCEDURE — 81003 URINALYSIS AUTO W/O SCOPE: CPT | Performed by: EMERGENCY MEDICINE

## 2022-05-26 PROCEDURE — 76815 OB US LIMITED FETUS(S): CPT | Performed by: EMERGENCY MEDICINE

## 2022-05-26 PROCEDURE — 85460 HEMOGLOBIN FETAL: CPT | Performed by: EMERGENCY MEDICINE

## 2022-05-26 PROCEDURE — 73110 X-RAY EXAM OF WRIST: CPT | Performed by: EMERGENCY MEDICINE

## 2022-05-26 PROCEDURE — 86901 BLOOD TYPING SEROLOGIC RH(D): CPT | Performed by: EMERGENCY MEDICINE

## 2022-05-26 PROCEDURE — 99285 EMERGENCY DEPT VISIT HI MDM: CPT

## 2022-05-26 PROCEDURE — 96372 THER/PROPH/DIAG INJ SC/IM: CPT

## 2022-05-26 PROCEDURE — 86900 BLOOD TYPING SEROLOGIC ABO: CPT | Performed by: EMERGENCY MEDICINE

## 2022-05-26 PROCEDURE — 86850 RBC ANTIBODY SCREEN: CPT | Performed by: EMERGENCY MEDICINE
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
## 2022-05-27 ENCOUNTER — TELEPHONE (OUTPATIENT)
Dept: OBGYN CLINIC | Facility: CLINIC | Age: 32
End: 2022-05-27

## 2022-05-27 ENCOUNTER — ROUTINE PRENATAL (OUTPATIENT)
Dept: OBGYN CLINIC | Facility: CLINIC | Age: 32
End: 2022-05-27
Payer: COMMERCIAL

## 2022-05-27 VITALS — SYSTOLIC BLOOD PRESSURE: 118 MMHG | DIASTOLIC BLOOD PRESSURE: 62 MMHG | WEIGHT: 161 LBS | BODY MASS INDEX: 29 KG/M2

## 2022-05-27 DIAGNOSIS — Z3A.21 21 WEEKS GESTATION OF PREGNANCY: Primary | ICD-10-CM
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
## 2022-05-27 LAB
GLUCOSE (URINE DIPSTICK): NEGATIVE MG/DL
KLEIHAUER BETKE RESULT: NEGATIVE
MULTISTIX LOT#: NORMAL NUMERIC
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
## 2022-06-07 ENCOUNTER — TELEPHONE (OUTPATIENT)
Dept: PHYSICAL THERAPY | Facility: HOSPITAL | Age: 32
End: 2022-06-07

## 2022-06-08 ENCOUNTER — OFFICE VISIT (OUTPATIENT)
Dept: PHYSICAL THERAPY | Age: 32
End: 2022-06-08
Attending: OBSTETRICS & GYNECOLOGY
Payer: COMMERCIAL

## 2022-06-08 DIAGNOSIS — O26.899 PELVIC PAIN AFFECTING PREGNANCY, ANTEPARTUM: ICD-10-CM

## 2022-06-08 DIAGNOSIS — R10.2 PELVIC PAIN AFFECTING PREGNANCY, ANTEPARTUM: ICD-10-CM

## 2022-06-08 PROCEDURE — 97163 PT EVAL HIGH COMPLEX 45 MIN: CPT

## 2022-06-08 PROCEDURE — 97112 NEUROMUSCULAR REEDUCATION: CPT
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
## 2022-06-15 ENCOUNTER — OFFICE VISIT (OUTPATIENT)
Dept: PHYSICAL THERAPY | Age: 32
End: 2022-06-15
Attending: OBSTETRICS & GYNECOLOGY
Payer: COMMERCIAL

## 2022-06-15 ENCOUNTER — ROUTINE PRENATAL (OUTPATIENT)
Dept: OBGYN CLINIC | Facility: CLINIC | Age: 32
End: 2022-06-15
Payer: COMMERCIAL

## 2022-06-15 VITALS
DIASTOLIC BLOOD PRESSURE: 70 MMHG | HEART RATE: 91 BPM | WEIGHT: 164 LBS | BODY MASS INDEX: 30 KG/M2 | SYSTOLIC BLOOD PRESSURE: 112 MMHG

## 2022-06-15 DIAGNOSIS — Z34.90 PRENATAL CARE, ANTEPARTUM: Primary | ICD-10-CM

## 2022-06-15 PROCEDURE — 97112 NEUROMUSCULAR REEDUCATION: CPT

## 2022-06-15 PROCEDURE — 3074F SYST BP LT 130 MM HG: CPT | Performed by: OBSTETRICS & GYNECOLOGY

## 2022-06-15 PROCEDURE — 3078F DIAST BP <80 MM HG: CPT | Performed by: OBSTETRICS & GYNECOLOGY

## 2022-06-15 PROCEDURE — 97535 SELF CARE MNGMENT TRAINING: CPT

## 2022-06-15 PROCEDURE — 81002 URINALYSIS NONAUTO W/O SCOPE: CPT | Performed by: OBSTETRICS & GYNECOLOGY

## 2022-06-15 PROCEDURE — 97110 THERAPEUTIC EXERCISES: CPT

## 2022-06-15 PROCEDURE — 97140 MANUAL THERAPY 1/> REGIONS: CPT
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
## 2022-06-22 ENCOUNTER — LAB ENCOUNTER (OUTPATIENT)
Dept: LAB | Age: 32
End: 2022-06-22
Attending: OBSTETRICS & GYNECOLOGY
Payer: COMMERCIAL

## 2022-06-22 ENCOUNTER — OFFICE VISIT (OUTPATIENT)
Dept: PHYSICAL THERAPY | Age: 32
End: 2022-06-22
Attending: OBSTETRICS & GYNECOLOGY
Payer: COMMERCIAL

## 2022-06-22 DIAGNOSIS — Z34.80 SUPERVISION OF OTHER NORMAL PREGNANCY, ANTEPARTUM: ICD-10-CM

## 2022-06-22 LAB
BASOPHILS # BLD AUTO: 0.07 X10(3) UL (ref 0–0.2)
BASOPHILS NFR BLD AUTO: 0.7 %
EOSINOPHIL # BLD AUTO: 0.27 X10(3) UL (ref 0–0.7)
EOSINOPHIL NFR BLD AUTO: 2.7 %
ERYTHROCYTE [DISTWIDTH] IN BLOOD BY AUTOMATED COUNT: 13.6 %
GLUCOSE 1H P GLC SERPL-MCNC: 109 MG/DL
HCT VFR BLD AUTO: 36.7 %
HGB BLD-MCNC: 11.6 G/DL
IMM GRANULOCYTES # BLD AUTO: 0.11 X10(3) UL (ref 0–1)
IMM GRANULOCYTES NFR BLD: 1.1 %
LYMPHOCYTES # BLD AUTO: 1.9 X10(3) UL (ref 1–4)
LYMPHOCYTES NFR BLD AUTO: 18.8 %
MCH RBC QN AUTO: 29.4 PG (ref 26–34)
MCHC RBC AUTO-ENTMCNC: 31.6 G/DL (ref 31–37)
MCV RBC AUTO: 92.9 FL
MONOCYTES # BLD AUTO: 0.48 X10(3) UL (ref 0.1–1)
MONOCYTES NFR BLD AUTO: 4.8 %
NEUTROPHILS # BLD AUTO: 7.26 X10 (3) UL (ref 1.5–7.7)
NEUTROPHILS # BLD AUTO: 7.26 X10(3) UL (ref 1.5–7.7)
NEUTROPHILS NFR BLD AUTO: 71.9 %
PLATELET # BLD AUTO: 260 10(3)UL (ref 150–450)
RBC # BLD AUTO: 3.95 X10(6)UL
WBC # BLD AUTO: 10.1 X10(3) UL (ref 4–11)

## 2022-06-22 PROCEDURE — 85025 COMPLETE CBC W/AUTO DIFF WBC: CPT

## 2022-06-22 PROCEDURE — 36415 COLL VENOUS BLD VENIPUNCTURE: CPT

## 2022-06-22 PROCEDURE — 97112 NEUROMUSCULAR REEDUCATION: CPT

## 2022-06-22 PROCEDURE — 97110 THERAPEUTIC EXERCISES: CPT

## 2022-06-22 PROCEDURE — 82950 GLUCOSE TEST: CPT

## 2022-06-23 ENCOUNTER — APPOINTMENT (OUTPATIENT)
Dept: PHYSICAL THERAPY | Age: 32
End: 2022-06-23
Attending: OBSTETRICS & GYNECOLOGY
Payer: COMMERCIAL

## 2022-06-29 ENCOUNTER — OFFICE VISIT (OUTPATIENT)
Dept: PHYSICAL THERAPY | Age: 32
End: 2022-06-29
Attending: OBSTETRICS & GYNECOLOGY
Payer: COMMERCIAL

## 2022-06-29 PROCEDURE — 97112 NEUROMUSCULAR REEDUCATION: CPT

## 2022-06-29 PROCEDURE — 97110 THERAPEUTIC EXERCISES: CPT

## 2022-06-30 ENCOUNTER — APPOINTMENT (OUTPATIENT)
Dept: PHYSICAL THERAPY | Age: 32
End: 2022-06-30
Attending: OBSTETRICS & GYNECOLOGY
Payer: COMMERCIAL

## 2022-07-13 ENCOUNTER — OFFICE VISIT (OUTPATIENT)
Dept: PHYSICAL THERAPY | Age: 32
End: 2022-07-13
Attending: OBSTETRICS & GYNECOLOGY
Payer: COMMERCIAL

## 2022-07-13 PROCEDURE — 97112 NEUROMUSCULAR REEDUCATION: CPT

## 2022-07-13 PROCEDURE — 97110 THERAPEUTIC EXERCISES: CPT

## 2022-07-14 ENCOUNTER — APPOINTMENT (OUTPATIENT)
Dept: PHYSICAL THERAPY | Age: 32
End: 2022-07-14
Attending: OBSTETRICS & GYNECOLOGY
Payer: COMMERCIAL

## 2022-07-18 ENCOUNTER — ROUTINE PRENATAL (OUTPATIENT)
Dept: OBGYN CLINIC | Facility: CLINIC | Age: 32
End: 2022-07-18
Payer: COMMERCIAL

## 2022-07-18 VITALS
SYSTOLIC BLOOD PRESSURE: 128 MMHG | HEART RATE: 96 BPM | HEIGHT: 62 IN | DIASTOLIC BLOOD PRESSURE: 62 MMHG | BODY MASS INDEX: 31.77 KG/M2 | WEIGHT: 172.63 LBS

## 2022-07-18 DIAGNOSIS — Z67.91 RH NEGATIVE STATE IN ANTEPARTUM PERIOD: ICD-10-CM

## 2022-07-18 DIAGNOSIS — O26.899 RH NEGATIVE STATE IN ANTEPARTUM PERIOD: ICD-10-CM

## 2022-07-18 DIAGNOSIS — Z34.80 PRENATAL CARE OF MULTIGRAVIDA, ANTEPARTUM: Primary | ICD-10-CM

## 2022-07-18 DIAGNOSIS — Z34.80 SUPERVISION OF OTHER NORMAL PREGNANCY, ANTEPARTUM: ICD-10-CM

## 2022-07-18 LAB
GLUCOSE (URINE DIPSTICK): NEGATIVE MG/DL
MULTISTIX LOT#: NORMAL NUMERIC

## 2022-07-18 NOTE — PROGRESS NOTES
GILMAR  Doing well, no concerns  GOOD FM  Advised on TDAP- will plan next GILMAR  Antibody screen and HIV ordered, to be done between 30-32 week, Rhogam again 32-34 weeks once completed  RTC in 2 wks  Fetal movement instructions given

## 2022-07-20 ENCOUNTER — OFFICE VISIT (OUTPATIENT)
Dept: PHYSICAL THERAPY | Age: 32
End: 2022-07-20
Attending: OBSTETRICS & GYNECOLOGY
Payer: COMMERCIAL

## 2022-07-20 PROCEDURE — 97110 THERAPEUTIC EXERCISES: CPT

## 2022-07-20 PROCEDURE — 97112 NEUROMUSCULAR REEDUCATION: CPT

## 2022-07-21 ENCOUNTER — APPOINTMENT (OUTPATIENT)
Dept: PHYSICAL THERAPY | Age: 32
End: 2022-07-21
Attending: OBSTETRICS & GYNECOLOGY
Payer: COMMERCIAL

## 2022-07-27 ENCOUNTER — OFFICE VISIT (OUTPATIENT)
Dept: PHYSICAL THERAPY | Age: 32
End: 2022-07-27
Attending: OBSTETRICS & GYNECOLOGY
Payer: COMMERCIAL

## 2022-07-27 ENCOUNTER — LAB ENCOUNTER (OUTPATIENT)
Dept: LAB | Age: 32
End: 2022-07-27
Attending: NURSE PRACTITIONER
Payer: COMMERCIAL

## 2022-07-27 ENCOUNTER — ROUTINE PRENATAL (OUTPATIENT)
Dept: OBGYN CLINIC | Facility: CLINIC | Age: 32
End: 2022-07-27
Payer: COMMERCIAL

## 2022-07-27 VITALS
SYSTOLIC BLOOD PRESSURE: 112 MMHG | BODY MASS INDEX: 32 KG/M2 | HEART RATE: 82 BPM | DIASTOLIC BLOOD PRESSURE: 76 MMHG | WEIGHT: 174.19 LBS

## 2022-07-27 DIAGNOSIS — Z34.90 PRENATAL CARE, ANTEPARTUM: ICD-10-CM

## 2022-07-27 DIAGNOSIS — Z34.80 SUPERVISION OF OTHER NORMAL PREGNANCY, ANTEPARTUM: ICD-10-CM

## 2022-07-27 DIAGNOSIS — O26.899 RH NEGATIVE STATE IN ANTEPARTUM PERIOD: ICD-10-CM

## 2022-07-27 DIAGNOSIS — Z67.91 RH NEGATIVE STATE IN ANTEPARTUM PERIOD: ICD-10-CM

## 2022-07-27 DIAGNOSIS — Z23 NEED FOR VACCINATION: Primary | ICD-10-CM

## 2022-07-27 LAB
ANTIBODY SCREEN: POSITIVE
GLUCOSE (URINE DIPSTICK): NEGATIVE MG/DL
MULTISTIX LOT#: 9053 NUMERIC
PROTEIN (URINE DIPSTICK): NEGATIVE MG/DL

## 2022-07-27 PROCEDURE — 36415 COLL VENOUS BLD VENIPUNCTURE: CPT

## 2022-07-27 PROCEDURE — 90715 TDAP VACCINE 7 YRS/> IM: CPT | Performed by: OBSTETRICS & GYNECOLOGY

## 2022-07-27 PROCEDURE — 97112 NEUROMUSCULAR REEDUCATION: CPT

## 2022-07-27 PROCEDURE — 86870 RBC ANTIBODY IDENTIFICATION: CPT

## 2022-07-27 PROCEDURE — 87389 HIV-1 AG W/HIV-1&-2 AB AG IA: CPT

## 2022-07-27 PROCEDURE — 97110 THERAPEUTIC EXERCISES: CPT

## 2022-07-27 PROCEDURE — 81002 URINALYSIS NONAUTO W/O SCOPE: CPT | Performed by: OBSTETRICS & GYNECOLOGY

## 2022-07-27 PROCEDURE — 3074F SYST BP LT 130 MM HG: CPT | Performed by: OBSTETRICS & GYNECOLOGY

## 2022-07-27 PROCEDURE — 90471 IMMUNIZATION ADMIN: CPT | Performed by: OBSTETRICS & GYNECOLOGY

## 2022-07-27 PROCEDURE — 86850 RBC ANTIBODY SCREEN: CPT

## 2022-07-27 PROCEDURE — 3078F DIAST BP <80 MM HG: CPT | Performed by: OBSTETRICS & GYNECOLOGY

## 2022-07-27 NOTE — PROGRESS NOTES
Patient presents with:  Prenatal Care: can she get 2nd covid booster     Routine prenatal visit without complaints. Patient denies any bleeding, leaking fluid, cramping, or regular uterine contractions. Good fetal movement. Assessment/Plan:  30w1d doing well  Still needs HIV and Ab screen--> 2nd Rhogam injection next visit  Kick counts reminded  Reviewed  labor signs and symptoms. Reviewed vaccine recommendations: Tdap today. Diagnoses and all orders for this visit:    Need for vaccination  -     TETANUS, DIPHTHERIA TOXOIDS AND ACELLULAR PERTUSIS VACCINE (TDAP), >7 YEARS, IM USE       Return in about 2 weeks (around 8/10/2022) for Routine Prenatal Visit and Rhogam, Labs Next Available.

## 2022-07-28 ENCOUNTER — APPOINTMENT (OUTPATIENT)
Dept: PHYSICAL THERAPY | Age: 32
End: 2022-07-28
Attending: OBSTETRICS & GYNECOLOGY
Payer: COMMERCIAL

## 2022-08-03 ENCOUNTER — APPOINTMENT (OUTPATIENT)
Dept: PHYSICAL THERAPY | Age: 32
End: 2022-08-03
Attending: OBSTETRICS & GYNECOLOGY
Payer: COMMERCIAL

## 2022-08-04 ENCOUNTER — APPOINTMENT (OUTPATIENT)
Dept: PHYSICAL THERAPY | Age: 32
End: 2022-08-04
Attending: OBSTETRICS & GYNECOLOGY
Payer: COMMERCIAL

## 2022-08-10 ENCOUNTER — OFFICE VISIT (OUTPATIENT)
Dept: PHYSICAL THERAPY | Age: 32
End: 2022-08-10
Attending: OBSTETRICS & GYNECOLOGY
Payer: COMMERCIAL

## 2022-08-10 ENCOUNTER — ROUTINE PRENATAL (OUTPATIENT)
Dept: OBGYN CLINIC | Facility: CLINIC | Age: 32
End: 2022-08-10
Payer: COMMERCIAL

## 2022-08-10 VITALS
HEART RATE: 91 BPM | HEIGHT: 62 IN | WEIGHT: 177.81 LBS | SYSTOLIC BLOOD PRESSURE: 108 MMHG | DIASTOLIC BLOOD PRESSURE: 68 MMHG | BODY MASS INDEX: 32.72 KG/M2

## 2022-08-10 DIAGNOSIS — Z34.80 PRENATAL CARE OF MULTIGRAVIDA, ANTEPARTUM: Primary | ICD-10-CM

## 2022-08-10 DIAGNOSIS — Z67.91 RH NEGATIVE STATE IN ANTEPARTUM PERIOD: ICD-10-CM

## 2022-08-10 DIAGNOSIS — O26.899 RH NEGATIVE STATE IN ANTEPARTUM PERIOD: ICD-10-CM

## 2022-08-10 LAB
GLUCOSE (URINE DIPSTICK): NEGATIVE MG/DL
MULTISTIX LOT#: NORMAL NUMERIC

## 2022-08-10 PROCEDURE — 81002 URINALYSIS NONAUTO W/O SCOPE: CPT | Performed by: OBSTETRICS & GYNECOLOGY

## 2022-08-10 PROCEDURE — 97112 NEUROMUSCULAR REEDUCATION: CPT

## 2022-08-10 PROCEDURE — 97110 THERAPEUTIC EXERCISES: CPT

## 2022-08-10 PROCEDURE — 3078F DIAST BP <80 MM HG: CPT | Performed by: OBSTETRICS & GYNECOLOGY

## 2022-08-10 PROCEDURE — 3074F SYST BP LT 130 MM HG: CPT | Performed by: OBSTETRICS & GYNECOLOGY

## 2022-08-10 PROCEDURE — 3008F BODY MASS INDEX DOCD: CPT | Performed by: OBSTETRICS & GYNECOLOGY

## 2022-08-10 PROCEDURE — 96372 THER/PROPH/DIAG INJ SC/IM: CPT | Performed by: OBSTETRICS & GYNECOLOGY

## 2022-08-11 ENCOUNTER — APPOINTMENT (OUTPATIENT)
Dept: PHYSICAL THERAPY | Age: 32
End: 2022-08-11
Attending: OBSTETRICS & GYNECOLOGY
Payer: COMMERCIAL

## 2022-08-17 ENCOUNTER — APPOINTMENT (OUTPATIENT)
Dept: PHYSICAL THERAPY | Age: 32
End: 2022-08-17
Attending: OBSTETRICS & GYNECOLOGY
Payer: COMMERCIAL

## 2022-08-18 ENCOUNTER — APPOINTMENT (OUTPATIENT)
Dept: PHYSICAL THERAPY | Age: 32
End: 2022-08-18
Attending: OBSTETRICS & GYNECOLOGY
Payer: COMMERCIAL

## 2022-08-23 ENCOUNTER — ROUTINE PRENATAL (OUTPATIENT)
Dept: OBGYN CLINIC | Facility: CLINIC | Age: 32
End: 2022-08-23
Payer: COMMERCIAL

## 2022-08-23 VITALS
WEIGHT: 181.19 LBS | SYSTOLIC BLOOD PRESSURE: 114 MMHG | BODY MASS INDEX: 33 KG/M2 | DIASTOLIC BLOOD PRESSURE: 70 MMHG | HEART RATE: 83 BPM

## 2022-08-23 DIAGNOSIS — Z34.90 PRENATAL CARE, ANTEPARTUM: Primary | ICD-10-CM

## 2022-08-23 LAB
GLUCOSE (URINE DIPSTICK): NEGATIVE MG/DL
MULTISTIX LOT#: 9053 NUMERIC
PROTEIN (URINE DIPSTICK): NEGATIVE MG/DL

## 2022-08-23 PROCEDURE — 81002 URINALYSIS NONAUTO W/O SCOPE: CPT | Performed by: NURSE PRACTITIONER

## 2022-08-23 PROCEDURE — 3078F DIAST BP <80 MM HG: CPT | Performed by: NURSE PRACTITIONER

## 2022-08-23 PROCEDURE — 3074F SYST BP LT 130 MM HG: CPT | Performed by: NURSE PRACTITIONER

## 2022-08-23 NOTE — PROGRESS NOTES
GILMAR  Doing well, no concerns or questions  GOOD FM  Denies VB/LOF/uctx  RTC in 2 wks  Fetal movement discussed

## 2022-08-26 ENCOUNTER — TELEPHONE (OUTPATIENT)
Dept: OBGYN CLINIC | Facility: CLINIC | Age: 32
End: 2022-08-26

## 2022-08-26 NOTE — TELEPHONE ENCOUNTER
G3/P 1011 GA 34 3/7 wks patient complaining of episode of dizziness. Blood sugar was 72. She felt better after she ate a sweet. Had eaten lunch about 10 minutes before the episode. Hasn't had this happen before. Last OV: 8/23/22 reuben with Walt Ariana   Pregnancy Complications: hx of SAB, hx of gestational HTN, Rh neg, pelvic pain  Abdominal pain: denies  Leaking of fluid: denies  Vaginal Bleeding: denies  Fetal Movement: +  Recommendations: advised to monitor. Encouraged regular snacks and hydration. To call if any new, worsening or persisting concerns. Pt states understanding and agrees with plan.

## 2022-09-06 ENCOUNTER — ROUTINE PRENATAL (OUTPATIENT)
Dept: OBGYN CLINIC | Facility: CLINIC | Age: 32
End: 2022-09-06
Payer: COMMERCIAL

## 2022-09-06 VITALS
SYSTOLIC BLOOD PRESSURE: 102 MMHG | HEIGHT: 62 IN | HEART RATE: 90 BPM | BODY MASS INDEX: 33.93 KG/M2 | WEIGHT: 184.38 LBS | DIASTOLIC BLOOD PRESSURE: 68 MMHG

## 2022-09-06 DIAGNOSIS — Z36.9 ANTENATAL SCREENING ENCOUNTER: ICD-10-CM

## 2022-09-06 DIAGNOSIS — Z34.80 PRENATAL CARE OF MULTIGRAVIDA, ANTEPARTUM: Primary | ICD-10-CM

## 2022-09-06 LAB
GLUCOSE (URINE DIPSTICK): NEGATIVE MG/DL
MULTISTIX LOT#: NORMAL NUMERIC

## 2022-09-06 PROCEDURE — 87653 STREP B DNA AMP PROBE: CPT | Performed by: OBSTETRICS & GYNECOLOGY

## 2022-09-06 PROCEDURE — 3078F DIAST BP <80 MM HG: CPT | Performed by: OBSTETRICS & GYNECOLOGY

## 2022-09-06 PROCEDURE — 87081 CULTURE SCREEN ONLY: CPT | Performed by: OBSTETRICS & GYNECOLOGY

## 2022-09-06 PROCEDURE — 3008F BODY MASS INDEX DOCD: CPT | Performed by: OBSTETRICS & GYNECOLOGY

## 2022-09-06 PROCEDURE — 81002 URINALYSIS NONAUTO W/O SCOPE: CPT | Performed by: OBSTETRICS & GYNECOLOGY

## 2022-09-06 PROCEDURE — 3074F SYST BP LT 130 MM HG: CPT | Performed by: OBSTETRICS & GYNECOLOGY

## 2022-09-06 NOTE — PROGRESS NOTES
Patient presents with:  Prenatal Care: Ector/Gbs, pt has question regarding what will happen the day of her delivery    Routine prenatal visit. Patient without complaints. Good fetal movement. Patient denies any bleeding, leaking fluid, cramping, or regular uterine contractions. SVE: 1-/-3 vertex  Assessment/Plan:  36w0d doing well  GBBS done  Kick counts reviewed. Reviewed labor signs and symptoms. Diagnoses and all orders for this visit:    Prenatal care of multigravida, antepartum  -     URINALYSIS NONAUTO W/O SCOPE (OB URISTIX)     screening encounter  -     STREP B CULTURE; Future      Return in about 1 week (around 2022) for Routine Prenatal Visit.

## 2022-09-08 LAB — GROUP B STREP BY PCR FOR PCR OVT: NEGATIVE

## 2022-09-13 ENCOUNTER — ROUTINE PRENATAL (OUTPATIENT)
Dept: OBGYN CLINIC | Facility: CLINIC | Age: 32
End: 2022-09-13
Payer: COMMERCIAL

## 2022-09-13 VITALS — BODY MASS INDEX: 34 KG/M2 | SYSTOLIC BLOOD PRESSURE: 126 MMHG | WEIGHT: 185 LBS | DIASTOLIC BLOOD PRESSURE: 74 MMHG

## 2022-09-13 DIAGNOSIS — Z34.80 SUPERVISION OF OTHER NORMAL PREGNANCY, ANTEPARTUM: ICD-10-CM

## 2022-09-13 DIAGNOSIS — Z3A.37 37 WEEKS GESTATION OF PREGNANCY: Primary | ICD-10-CM

## 2022-09-13 LAB
GLUCOSE (URINE DIPSTICK): NEGATIVE MG/DL
MULTISTIX LOT#: NORMAL NUMERIC

## 2022-09-13 PROCEDURE — 3078F DIAST BP <80 MM HG: CPT | Performed by: NURSE PRACTITIONER

## 2022-09-13 PROCEDURE — 3074F SYST BP LT 130 MM HG: CPT | Performed by: NURSE PRACTITIONER

## 2022-09-13 PROCEDURE — 81002 URINALYSIS NONAUTO W/O SCOPE: CPT | Performed by: NURSE PRACTITIONER

## 2022-09-13 RX ORDER — LANSOPRAZOLE 15 MG/1
CAPSULE, DELAYED RELEASE ORAL
COMMUNITY
Start: 2022-08-25

## 2022-09-13 NOTE — PROGRESS NOTES
GILMAR  Doing well, +FM  Denies VB/LOF/uctx  Mode of delivery:  anticipated  Labor precautions discussed  SVE deferred  RTC 1 week

## 2022-09-20 ENCOUNTER — ROUTINE PRENATAL (OUTPATIENT)
Dept: OBGYN CLINIC | Facility: CLINIC | Age: 32
End: 2022-09-20

## 2022-09-20 VITALS
SYSTOLIC BLOOD PRESSURE: 108 MMHG | BODY MASS INDEX: 34.25 KG/M2 | DIASTOLIC BLOOD PRESSURE: 70 MMHG | HEART RATE: 88 BPM | HEIGHT: 62 IN | WEIGHT: 186.13 LBS

## 2022-09-20 DIAGNOSIS — Z3A.38 38 WEEKS GESTATION OF PREGNANCY: Primary | ICD-10-CM

## 2022-09-20 PROCEDURE — 3078F DIAST BP <80 MM HG: CPT | Performed by: OBSTETRICS & GYNECOLOGY

## 2022-09-20 PROCEDURE — 3074F SYST BP LT 130 MM HG: CPT | Performed by: OBSTETRICS & GYNECOLOGY

## 2022-09-20 PROCEDURE — 3008F BODY MASS INDEX DOCD: CPT | Performed by: OBSTETRICS & GYNECOLOGY

## 2022-09-20 PROCEDURE — 81002 URINALYSIS NONAUTO W/O SCOPE: CPT | Performed by: OBSTETRICS & GYNECOLOGY

## 2022-09-20 NOTE — PROGRESS NOTES
GILMAR 38w0d    Doing well, +FM  No regular contractions  No LOF, VB    1. FHT-present  2. PNL:  GBS neg  3. Mode of delivery:  anticipated, check SVE next visit   4.  Immunizations: s/p TDAP    Return in 1 weeks

## 2022-09-23 ENCOUNTER — TELEPHONE (OUTPATIENT)
Dept: OBGYN CLINIC | Facility: CLINIC | Age: 32
End: 2022-09-23

## 2022-09-27 ENCOUNTER — ROUTINE PRENATAL (OUTPATIENT)
Dept: OBGYN CLINIC | Facility: CLINIC | Age: 32
End: 2022-09-27

## 2022-09-27 VITALS — DIASTOLIC BLOOD PRESSURE: 76 MMHG | SYSTOLIC BLOOD PRESSURE: 122 MMHG | WEIGHT: 189.38 LBS | BODY MASS INDEX: 35 KG/M2

## 2022-09-27 DIAGNOSIS — Z3A.39 39 WEEKS GESTATION OF PREGNANCY: Primary | ICD-10-CM

## 2022-09-27 PROCEDURE — 3078F DIAST BP <80 MM HG: CPT | Performed by: OBSTETRICS & GYNECOLOGY

## 2022-09-27 PROCEDURE — 3074F SYST BP LT 130 MM HG: CPT | Performed by: OBSTETRICS & GYNECOLOGY

## 2022-09-27 PROCEDURE — 81002 URINALYSIS NONAUTO W/O SCOPE: CPT | Performed by: OBSTETRICS & GYNECOLOGY
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
## 2022-09-28 ENCOUNTER — MED REC SCAN ONLY (OUTPATIENT)
Dept: OBGYN CLINIC | Facility: CLINIC | Age: 32
End: 2022-09-28

## 2022-09-28 ENCOUNTER — TELEPHONE (OUTPATIENT)
Dept: OBGYN CLINIC | Facility: CLINIC | Age: 32
End: 2022-09-28

## 2022-09-28 DIAGNOSIS — Z20.822 ENCOUNTER FOR PREPROCEDURE SCREENING LABORATORY TESTING FOR COVID-19: Primary | ICD-10-CM

## 2022-09-28 DIAGNOSIS — Z01.812 ENCOUNTER FOR PREPROCEDURE SCREENING LABORATORY TESTING FOR COVID-19: Primary | ICD-10-CM

## 2022-09-28 NOTE — TELEPHONE ENCOUNTER
----- Message from Nate Marrero MD sent at 9/27/2022  6:50 PM CDT -----  Regarding: IOL  IOL scheduled 10/5 at 8am, please add to calendar.

## 2022-09-28 NOTE — TELEPHONE ENCOUNTER
Paperwork completed. Dr. Bertram Cranker signed. Faxed to [employer]. Copy sent to scanning. Copy in \[facility]\""    lvm for pt paperwork is completed.

## 2022-10-03 ENCOUNTER — LAB ENCOUNTER (OUTPATIENT)
Dept: LAB | Age: 32
End: 2022-10-03
Attending: OBSTETRICS & GYNECOLOGY
Payer: COMMERCIAL

## 2022-10-03 ENCOUNTER — TELEPHONE (OUTPATIENT)
Dept: OBGYN UNIT | Facility: HOSPITAL | Age: 32
End: 2022-10-03

## 2022-10-03 ENCOUNTER — ROUTINE PRENATAL (OUTPATIENT)
Dept: OBGYN CLINIC | Facility: CLINIC | Age: 32
End: 2022-10-03
Payer: COMMERCIAL

## 2022-10-03 VITALS — BODY MASS INDEX: 35 KG/M2 | DIASTOLIC BLOOD PRESSURE: 74 MMHG | SYSTOLIC BLOOD PRESSURE: 114 MMHG | WEIGHT: 189.25 LBS

## 2022-10-03 DIAGNOSIS — Z01.812 ENCOUNTER FOR PREPROCEDURE SCREENING LABORATORY TESTING FOR COVID-19: ICD-10-CM

## 2022-10-03 DIAGNOSIS — Z20.822 ENCOUNTER FOR PREPROCEDURE SCREENING LABORATORY TESTING FOR COVID-19: ICD-10-CM

## 2022-10-03 DIAGNOSIS — Z3A.39 39 WEEKS GESTATION OF PREGNANCY: Primary | ICD-10-CM

## 2022-10-03 NOTE — PROGRESS NOTES
Patient presents with:  Prenatal Care: GILMAR    Routine prenatal visit without complaints. Patient denies any bleeding, leaking fluid, cramping, or regular uterine contractions. Good fetal movement. SVE: 2-3/98-/-2 vertex  Assessment/Plan:  39w6d doing well  GBS neg. Membranes swept  IOL Wednesday 8 am  Kick counts reviewed. Reviewed labor signs and symptoms. Diagnoses and all orders for this visit:    39 weeks gestation of pregnancy  -     URINALYSIS NONAUTO W/O SCOPE (OB URISTIX)       Return in about 6 weeks (around 11/14/2022) for Postpartum Visit.

## 2022-10-03 NOTE — PATIENT INSTRUCTIONS
[facility] Obstetrics & Gynecology  Pitocin Induction of Labor Information      You have been scheduled for induction of labor with pitocin on the morning of 10/5/22 at 8 am.  Here is some information regarding the induction process:    - You will need a COVID test within 72 hours prior to your admission. You will be contacted by central scheduling to set this up. If your induction is being scheduled on less than 72 hours notice, then a rapid Covid test will be performed on admission.    - Occasionally inductions will be delayed or even moved to a different day due to the need for Labor and Delivery to accommodate laboring patients. Labor and Delivery will contact you if such a situation arises. Otherwise, please plan on arriving at your scheduled induction time. - Your care partner should plan on being with you throughout the induction process. There is a couch/daybed available in the labor room for your partner's use. Policies regarding additional care partners can change periodically. - Pitocin is given through the IV to start the induction process. We will usually try to break the bag of water at some point to further help the induction process. - At the hospital we usually do not allow you to eat or drink anything. IV fluids are given to prevent dehydration.    - Let the nurse know if you need something to help with pain relief. Options include IV drugs such as dilaudid if you are in early labor, or an epidural if you are in active labor and sufficiently dilated.     - We encourage you to move around during the induction process, as this can help the baby descend through the birth canal.

## 2022-10-04 LAB — SARS-COV-2 RNA RESP QL NAA+PROBE: NOT DETECTED

## 2022-10-05 ENCOUNTER — ANESTHESIA (OUTPATIENT)
Dept: OBGYN UNIT | Facility: HOSPITAL | Age: 32
End: 2022-10-05
Payer: COMMERCIAL

## 2022-10-05 ENCOUNTER — ANESTHESIA EVENT (OUTPATIENT)
Dept: OBGYN UNIT | Facility: HOSPITAL | Age: 32
End: 2022-10-05
Payer: COMMERCIAL

## 2022-10-05 ENCOUNTER — HOSPITAL ENCOUNTER (INPATIENT)
Facility: HOSPITAL | Age: 32
LOS: 2 days | Discharge: HOME | End: 2022-10-07
Attending: OBSTETRICS & GYNECOLOGY | Admitting: OBSTETRICS & GYNECOLOGY
Payer: COMMERCIAL

## 2022-10-05 ENCOUNTER — APPOINTMENT (OUTPATIENT)
Dept: OBGYN CLINIC | Facility: HOSPITAL | Age: 32
End: 2022-10-05
Payer: COMMERCIAL

## 2022-10-05 PROBLEM — Z34.90 PREGNANCY: Status: ACTIVE | Noted: 2022-10-05

## 2022-10-05 PROBLEM — Z34.90 PREGNANCY (HCC): Status: ACTIVE | Noted: 2022-10-05

## 2022-10-05 LAB
BASOPHILS # BLD AUTO: 0.08 X10(3) UL (ref 0–0.2)
BASOPHILS NFR BLD AUTO: 0.7 %
EOSINOPHIL # BLD AUTO: 0.44 X10(3) UL (ref 0–0.7)
EOSINOPHIL NFR BLD AUTO: 3.6 %
ERYTHROCYTE [DISTWIDTH] IN BLOOD BY AUTOMATED COUNT: 13.9 %
HCT VFR BLD AUTO: 39 %
HGB BLD-MCNC: 13.1 G/DL
IMM GRANULOCYTES # BLD AUTO: 0.14 X10(3) UL (ref 0–1)
IMM GRANULOCYTES NFR BLD: 1.2 %
LYMPHOCYTES # BLD AUTO: 2.39 X10(3) UL (ref 1–4)
LYMPHOCYTES NFR BLD AUTO: 19.6 %
MCH RBC QN AUTO: 29.6 PG (ref 26–34)
MCHC RBC AUTO-ENTMCNC: 33.6 G/DL (ref 31–37)
MCV RBC AUTO: 88 FL
MONOCYTES # BLD AUTO: 0.74 X10(3) UL (ref 0.1–1)
MONOCYTES NFR BLD AUTO: 6.1 %
NEUTROPHILS # BLD AUTO: 8.38 X10 (3) UL (ref 1.5–7.7)
NEUTROPHILS # BLD AUTO: 8.38 X10(3) UL (ref 1.5–7.7)
NEUTROPHILS NFR BLD AUTO: 68.8 %
PLATELET # BLD AUTO: 242 10(3)UL (ref 150–450)
RBC # BLD AUTO: 4.43 X10(6)UL
T PALLIDUM AB SER QL IA: NONREACTIVE
WBC # BLD AUTO: 12.2 X10(3) UL (ref 4–11)

## 2022-10-05 PROCEDURE — 10907ZC DRAINAGE OF AMNIOTIC FLUID, THERAPEUTIC FROM PRODUCTS OF CONCEPTION, VIA NATURAL OR ARTIFICIAL OPENING: ICD-10-PCS | Performed by: OBSTETRICS & GYNECOLOGY

## 2022-10-05 PROCEDURE — 3E033VJ INTRODUCTION OF OTHER HORMONE INTO PERIPHERAL VEIN, PERCUTANEOUS APPROACH: ICD-10-PCS | Performed by: OBSTETRICS & GYNECOLOGY

## 2022-10-05 RX ORDER — TERBUTALINE SULFATE 1 MG/ML
0.25 INJECTION, SOLUTION SUBCUTANEOUS AS NEEDED
Status: DISCONTINUED | OUTPATIENT
Start: 2022-10-05 | End: 2022-10-06 | Stop reason: HOSPADM

## 2022-10-05 RX ORDER — BUPIVACAINE HCL/0.9 % NACL/PF 0.25 %
5 PLASTIC BAG, INJECTION (ML) EPIDURAL AS NEEDED
Status: DISCONTINUED | OUTPATIENT
Start: 2022-10-05 | End: 2022-10-06

## 2022-10-05 RX ORDER — DEXTROSE, SODIUM CHLORIDE, SODIUM LACTATE, POTASSIUM CHLORIDE, AND CALCIUM CHLORIDE 5; .6; .31; .03; .02 G/100ML; G/100ML; G/100ML; G/100ML; G/100ML
INJECTION, SOLUTION INTRAVENOUS AS NEEDED
Status: DISCONTINUED | OUTPATIENT
Start: 2022-10-05 | End: 2022-10-06 | Stop reason: HOSPADM

## 2022-10-05 RX ORDER — TRISODIUM CITRATE DIHYDRATE AND CITRIC ACID MONOHYDRATE 500; 334 MG/5ML; MG/5ML
30 SOLUTION ORAL AS NEEDED
Status: DISCONTINUED | OUTPATIENT
Start: 2022-10-05 | End: 2022-10-06 | Stop reason: HOSPADM

## 2022-10-05 RX ORDER — SODIUM CHLORIDE, SODIUM LACTATE, POTASSIUM CHLORIDE, CALCIUM CHLORIDE 600; 310; 30; 20 MG/100ML; MG/100ML; MG/100ML; MG/100ML
INJECTION, SOLUTION INTRAVENOUS CONTINUOUS
Status: DISCONTINUED | OUTPATIENT
Start: 2022-10-05 | End: 2022-10-06 | Stop reason: HOSPADM

## 2022-10-05 RX ORDER — AMMONIA INHALANTS 0.04 G/.3ML
0.3 INHALANT RESPIRATORY (INHALATION) AS NEEDED
Status: DISCONTINUED | OUTPATIENT
Start: 2022-10-05 | End: 2022-10-06 | Stop reason: HOSPADM

## 2022-10-05 RX ORDER — NALBUPHINE HCL 10 MG/ML
2.5 AMPUL (ML) INJECTION
Status: DISCONTINUED | OUTPATIENT
Start: 2022-10-05 | End: 2022-10-06

## 2022-10-05 RX ORDER — ONDANSETRON 2 MG/ML
4 INJECTION INTRAMUSCULAR; INTRAVENOUS EVERY 6 HOURS PRN
Status: DISCONTINUED | OUTPATIENT
Start: 2022-10-05 | End: 2022-10-06 | Stop reason: HOSPADM

## 2022-10-05 RX ORDER — ACETAMINOPHEN 500 MG
500 TABLET ORAL EVERY 6 HOURS PRN
Status: DISCONTINUED | OUTPATIENT
Start: 2022-10-05 | End: 2022-10-06 | Stop reason: HOSPADM

## 2022-10-05 RX ORDER — IBUPROFEN 600 MG/1
600 TABLET ORAL EVERY 6 HOURS PRN
Status: DISCONTINUED | OUTPATIENT
Start: 2022-10-05 | End: 2022-10-06 | Stop reason: HOSPADM

## 2022-10-05 NOTE — PLAN OF CARE
Problem: BIRTH - VAGINAL/ SECTION  Goal: Fetal and maternal status remain reassuring during the birth process  Description: INTERVENTIONS:  - Monitor vital signs  - Monitor fetal heart rate  - Monitor uterine activity  - Monitor labor progression (vaginal delivery)  - DVT prophylaxis (C/S delivery)  - Surgical antibiotic prophylaxis (C/S delivery)  Outcome: Progressing     Problem: PAIN - ADULT  Goal: Verbalizes/displays adequate comfort level or patient's stated pain goal  Description: INTERVENTIONS:  - Encourage pt to monitor pain and request assistance  - Assess pain using appropriate pain scale  - Administer analgesics based on type and severity of pain and evaluate response  - Implement non-pharmacological measures as appropriate and evaluate response  - Consider cultural and social influences on pain and pain management  - Manage/alleviate anxiety  - Utilize distraction and/or relaxation techniques  - Monitor for opioid side effects  - Notify MD/LIP if interventions unsuccessful or patient reports new pain  - Anticipate increased pain with activity and pre-medicate as appropriate  Outcome: Progressing     Problem: ANXIETY  Goal: Will report anxiety at manageable levels  Description: INTERVENTIONS:  - Administer medication as ordered  - Teach and rehearse alternative coping skills  - Provide emotional support with 1:1 interaction with staff  Outcome: Progressing     Problem: Patient/Family Goals  Goal: Patient/Family Long Term Goal  Description: Patient's Long Term Goal: Uncomplicated vaginal delivery    Interventions:  VS per protocol  I&O  Ice chips and sips as tolerated  EFM per protocol  Maintain IV as ordered  Antibiotics as needed per protocol  Informed consent     - See additional Care Plan goals for specific interventions  Outcome: Progressing  Goal: Patient/Family Short Term Goal  Description: Patient's Short Term Goal: Adequate pain control with delivery of infant    Interventions:  Pain assessment scores as ordered  Patient scores pain a \"3\" or less  Multidisciplinary care   Nonpharmacologic comfort measures     - See additional Care Plan goals for specific interventions  Outcome: Progressing

## 2022-10-06 PROBLEM — R10.2 PELVIC PAIN AFFECTING PREGNANCY (HCC): Status: RESOLVED | Noted: 2022-05-20 | Resolved: 2022-10-06

## 2022-10-06 PROBLEM — R10.2 PELVIC PAIN AFFECTING PREGNANCY: Status: RESOLVED | Noted: 2022-05-20 | Resolved: 2022-10-06

## 2022-10-06 PROBLEM — O26.899 PELVIC PAIN AFFECTING PREGNANCY (HCC): Status: RESOLVED | Noted: 2022-05-20 | Resolved: 2022-10-06

## 2022-10-06 PROBLEM — O26.899 PELVIC PAIN AFFECTING PREGNANCY: Status: RESOLVED | Noted: 2022-05-20 | Resolved: 2022-10-06

## 2022-10-06 PROBLEM — Z34.80 SUPERVISION OF OTHER NORMAL PREGNANCY, ANTEPARTUM (HCC): Status: RESOLVED | Noted: 2022-05-18 | Resolved: 2022-10-06

## 2022-10-06 PROBLEM — Z34.90 PREGNANCY (HCC): Status: RESOLVED | Noted: 2022-10-05 | Resolved: 2022-10-06

## 2022-10-06 PROBLEM — Z34.80 SUPERVISION OF OTHER NORMAL PREGNANCY, ANTEPARTUM: Status: RESOLVED | Noted: 2022-05-18 | Resolved: 2022-10-06

## 2022-10-06 PROBLEM — Z98.890 HISTORY OF EPISIOTOMY: Status: ACTIVE | Noted: 2022-10-06

## 2022-10-06 PROBLEM — Z34.90 PREGNANCY: Status: RESOLVED | Noted: 2022-10-05 | Resolved: 2022-10-06

## 2022-10-06 LAB
ANTIBODY SCREEN: POSITIVE
FETAL SCREEN RESULT: NEGATIVE
RH BLOOD TYPE: NEGATIVE

## 2022-10-06 PROCEDURE — 3E0234Z INTRODUCTION OF SERUM, TOXOID AND VACCINE INTO MUSCLE, PERCUTANEOUS APPROACH: ICD-10-PCS | Performed by: OBSTETRICS & GYNECOLOGY

## 2022-10-06 PROCEDURE — 0KQM0ZZ REPAIR PERINEUM MUSCLE, OPEN APPROACH: ICD-10-PCS | Performed by: OBSTETRICS & GYNECOLOGY

## 2022-10-06 PROCEDURE — 59400 OBSTETRICAL CARE: CPT | Performed by: OBSTETRICS & GYNECOLOGY

## 2022-10-06 RX ORDER — ACETAMINOPHEN 500 MG
1000 TABLET ORAL EVERY 6 HOURS PRN
Status: DISCONTINUED | OUTPATIENT
Start: 2022-10-06 | End: 2022-10-07

## 2022-10-06 RX ORDER — ACETAMINOPHEN 500 MG
500 TABLET ORAL EVERY 6 HOURS PRN
Status: DISCONTINUED | OUTPATIENT
Start: 2022-10-06 | End: 2022-10-07

## 2022-10-06 RX ORDER — SIMETHICONE 80 MG
80 TABLET,CHEWABLE ORAL 3 TIMES DAILY PRN
Status: DISCONTINUED | OUTPATIENT
Start: 2022-10-06 | End: 2022-10-07

## 2022-10-06 RX ORDER — IBUPROFEN 600 MG/1
600 TABLET ORAL EVERY 6 HOURS
Status: DISCONTINUED | OUTPATIENT
Start: 2022-10-06 | End: 2022-10-07

## 2022-10-06 RX ORDER — DOCUSATE SODIUM 100 MG/1
100 CAPSULE, LIQUID FILLED ORAL
Status: DISCONTINUED | OUTPATIENT
Start: 2022-10-06 | End: 2022-10-07

## 2022-10-06 RX ORDER — BISACODYL 10 MG
10 SUPPOSITORY, RECTAL RECTAL ONCE AS NEEDED
Status: DISCONTINUED | OUTPATIENT
Start: 2022-10-06 | End: 2022-10-07

## 2022-10-06 NOTE — PLAN OF CARE
Problem: SAFETY ADULT - FALL  Goal: Free from fall injury  Description: INTERVENTIONS:  - Assess pt frequently for physical needs  - Identify cognitive and physical deficits and behaviors that affect risk of falls.   - [place] fall precautions as indicated by assessment.  - Educate pt/family on patient safety including physical limitations  - Instruct pt to call for assistance with activity based on assessment  - Modify environment to reduce risk of injury  - Provide assistive devices as appropriate  - Consider OT/PT consult to assist with strengthening/mobility  - Encourage toileting schedule  10/6/2022 0223 by Dhara Robledo RN  Outcome: Progressing  10/5/2022 2031 by Dhara Robledo RN  Outcome: Progressing     Problem: BIRTH - VAGINAL/ SECTION  Goal: Fetal and maternal status remain reassuring during the birth process  Description: INTERVENTIONS:  - Monitor vital signs  - Monitor fetal heart rate  - Monitor uterine activity  - Monitor labor progression (vaginal delivery)  - DVT prophylaxis (C/S delivery)  - Surgical antibiotic prophylaxis (C/S delivery)  10/6/2022 0223 by Dhara Robledo RN  Outcome: Completed  10/5/2022 1919 by Dhara Robledo RN  Outcome: Progressing     Problem: PAIN - ADULT  Goal: Verbalizes/displays adequate comfort level or patient's stated pain goal  Description: INTERVENTIONS:  - Encourage pt to monitor pain and request assistance  - Assess pain using appropriate pain scale  - Administer analgesics based on type and severity of pain and evaluate response  - Implement non-pharmacological measures as appropriate and evaluate response  - Consider cultural and social influences on pain and pain management  - Manage/alleviate anxiety  - Utilize distraction and/or relaxation techniques  - Monitor for opioid side effects  - Notify MD/LIP if interventions unsuccessful or patient reports new pain  - Anticipate increased pain with activity and pre-medicate as appropriate  10/6/2022 9966 by Delia Browning RN  Outcome: Completed  10/5/2022 1919 by Delia Browning RN  Outcome: Progressing     Problem: ANXIETY  Goal: Will report anxiety at manageable levels  Description: INTERVENTIONS:  - Administer medication as ordered  - Teach and rehearse alternative coping skills  - Provide emotional support with 1:1 interaction with staff  10/6/2022 0223 by Delia Browning RN  Outcome: Completed  10/5/2022 1919 by Delia Browning RN  Outcome: Progressing     Problem: Patient/Family Goals  Goal: Patient/Family Long Term Goal  Description: Patient's Long Term Goal: uncomplicated delivery    Interventions:  -   - See additional Care Plan goals for specific interventions  10/6/2022 0223 by Delia Browning RN  Outcome: Completed  10/5/2022 1919 by Delia Browning RN  Outcome: Progressing  Goal: Patient/Family Short Term Goal  Description: Patient's Short Term Goal: pain control     Interventions:   -   - See additional Care Plan goals for specific interventions  10/6/2022 0223 by Delia Browning RN  Outcome: Completed  10/5/2022 1919 by Delia Browning RN  Outcome: Progressing

## 2022-10-06 NOTE — PLAN OF CARE
Problem: SAFETY ADULT - FALL  Goal: Free from fall injury  Description: INTERVENTIONS:  - Assess pt frequently for physical needs  - Identify cognitive and physical deficits and behaviors that affect risk of falls.   - [place] fall precautions as indicated by assessment.  - Educate pt/family on patient safety including physical limitations  - Instruct pt to call for assistance with activity based on assessment  - Modify environment to reduce risk of injury  - Provide assistive devices as appropriate  - Consider OT/PT consult to assist with strengthening/mobility  - Encourage toileting schedule  Outcome: Progressing

## 2022-10-06 NOTE — PLAN OF CARE
Problem: BIRTH - VAGINAL/ SECTION  Goal: Fetal and maternal status remain reassuring during the birth process  Description: INTERVENTIONS:  - Monitor vital signs  - Monitor fetal heart rate  - Monitor uterine activity  - Monitor labor progression (vaginal delivery)  - DVT prophylaxis (C/S delivery)  - Surgical antibiotic prophylaxis (C/S delivery)  Outcome: Progressing     Problem: PAIN - ADULT  Goal: Verbalizes/displays adequate comfort level or patient's stated pain goal  Description: INTERVENTIONS:  - Encourage pt to monitor pain and request assistance  - Assess pain using appropriate pain scale  - Administer analgesics based on type and severity of pain and evaluate response  - Implement non-pharmacological measures as appropriate and evaluate response  - Consider cultural and social influences on pain and pain management  - Manage/alleviate anxiety  - Utilize distraction and/or relaxation techniques  - Monitor for opioid side effects  - Notify MD/LIP if interventions unsuccessful or patient reports new pain  - Anticipate increased pain with activity and pre-medicate as appropriate  Outcome: Progressing     Problem: ANXIETY  Goal: Will report anxiety at manageable levels  Description: INTERVENTIONS:  - Administer medication as ordered  - Teach and rehearse alternative coping skills  - Provide emotional support with 1:1 interaction with staff  Outcome: Progressing     Problem: Patient/Family Goals  Goal: Patient/Family Long Term Goal  Description: Patient's Long Term Goal: uncomplicated delivery    Interventions:  -   - See additional Care Plan goals for specific interventions  Outcome: Progressing  Goal: Patient/Family Short Term Goal  Description: Patient's Short Term Goal: pain control     Interventions:   -   - See additional Care Plan goals for specific interventions  Outcome: Progressing

## 2022-10-06 NOTE — PROGRESS NOTES
Patient up to bathroom with assist x 2. Unable to void at this time. Patient transferred to mother/baby room 2215 per wheelchair in stable condition with baby and personal belongings. Accompanied by significant other and staff. Report given to mother/baby RN.

## 2022-10-06 NOTE — L&D DELIVERY NOTE
Brauweiler, Boy [[id number]]    Labor Events     labor?: No   steroids?: None  Antibiotics received during labor?: No  Antibiotics (enter # doses in comment): none  Rupture date/time: 10/5/2022 1851     Rupture type: AROM  Fluid color: Clear  Induction: Oxytocin  Indications for induction: Elective  Augmentation: None  Intrapartum & labor complications: None     Labor Event Times    Labor onset date/time: 10/5/2022 1600  Dilation complete date/time: 10/6/2022 0038  Start pushing date/time: 10/6/2022 0038      Presentation    Presentation: Vertex  Position: Occiput Anterior     Operative Delivery    Operative Vaginal Delivery: No            Shoulder Dystocia    Shoulder Dystocia: No     Anesthesia    Method: Epidural           Delivery    Head delivery date/time: 10/6/2022 02:13:29   Delivery date/time:  10/6/22 02:14:52   Delivery type: Normal spontaneous vaginal delivery    Details:     Delivery location: delivery room     Delivery Providers    Delivering Clinician: Elli Ellis MD   Delivery personnel:  Provider Role   Jamie Champion, RN Baby Nurse   Finesse Simms, RN Delivery Nurse   Cezar Cunha, RN Baby Nurse         Cord    Vessels: 3 Vessels  Complications: None  Timed cord clamping: Yes  Time in sec: 20  Cord blood disposition: to lab  Gases sent?: Yes     Resuscitation    Method: Oxygen      Measurements    Weight: 3820 g 8 lb 6.8 oz Length: 54.6 cm   Head circum. : 36 cm Chest circum.: 33.5 cm      Abdominal circum.: 32.5 cm       Placenta    Date/time: 10/6/2022 0224  Removal: Spontaneous  Appearance: Intact  Disposition: held for future pathology     Apgars    Living status: Living   Apgar Scoring Key:    0 1 2    Skin color Blue or pale Acrocyanotic Completely pink    Heart rate Absent <100 bpm >100 bpm    Reflex irritability No response Grimace Cry or active withdrawal    Muscle tone Limp Some flexion Active motion    Respiratory effort Absent Weak cry; hypoventilation Good, crying              1 Minute:  5 Minute:  10 Minute:  15 Minute:  20 Minute:    Skin color:        Heart rate:        Reflex irritablity:        Muscle tone:        Respiratory effort: Total:           Apgars assigned by: Lucas Rockwell RN  [place] disposition: with mother     Skin to Skin    Skin to skin initiated date/time: 10/6/2022 0237  Skin to skin with: Mother     Vaginal Count    Initial count RN: Juan Jose Nguyen RN  Initial count Tech: Flaquita Fragoso   Sponges   Sharps    Initial counts 11   0    Final counts 11   2    Final count RN: Juan Jose Nguyen RN  Final count MD: Antonino Valerio MD     Delivery (Maternal)    Episiotomy: Median  Indications for episiotomy: Fetal Macrosomia  Perineal lacerations: 2nd Repaired?: Yes   Vaginal laceration?: No    Cervical laceration?: No    Clitoral laceration?: No    Quantitative blood loss (mL): 392                                                                  Vaginal Delivery Note          Littie Keys Brauweiler Patient Status:  Inpatient    11/3/1990 MRN [id number]   Location [address] [place] [address] Attending Antonino Valerio MD   Hosp Day # 1 PCP No primary care provider on file. Pre Op Dx:    3. 29 yo  at 40w2d with live intrauterine pregnancy  2. Induction of labor  3. History of episiotomy with first delivery    Post Op Dx:   3. 29 yo [id number] s/p  with episiotomy    Op: Normal Spontaneous Vaginal Delivery with episiotomy    Surgeon:  Nidia Mcgrath MD       Anesthesia: epidural    EBL:  See QBL ml    Indications:  See Delivery Summary    Findings:      Head: TAYLOR   Sex: male \"Ramsey Smith\"      Weight: 3820 g       Apgars:  7/9       Nuchal: none   Placenta: spontaneous and intact    Unique findings: see description below      Procedure: The patient was found to be complete and began pushing at 00:38 with good effort.   I was called to the room after approximately 1 hour of pushing, as patient was pushing with good effort, but exhausted and with minimal progress past +3 station. I pushed with her over several contractions, and with vaginal pressure brought the fetus to . The introitus was noted to be tight, and scarred in the area of her prior episiotomy. The patients was placed in the dorsolithotomy position and prepped. She was encouraged to push and delivered the fetal head, slowly over several contractions, as the scarred area in the perineum would not stretch or tear. Eventually the head did deliver, and the anterior shoulder delivered successfully. However, the shoulders could not be completely delivered as they were wider than the skin of the introitus, which still would not stretch or tear. Verbally discussed episiotomy, which the patient consented to. The RN supported the fetal head, as again the head, anterior shoulder, and a porton of the posterior shoulder were delivered, and a small right mediolateral episiotomy was cut. Once this occurred, the skin stretched easily and infant delivered without issues at 02:14. . Infant was placed directly on mother's abdomen for bonding initially, however, was somewhat stunned, so taken to the warmer for resuscitation, and responded well. The cord blood was sampled after cord gases were collected. IV pitocin and gentle uterine massage helped delivery of the placenta intact with 3 vessels. The episiotomy that extended into a shallow second degree laceration was repaired using 2-0 and 4-0 vicryl. The patient was then moved to the supine position in stable condition. Counts were correct. Complications:  None    Mother and infant in good condition.   I suggest if patient has further children she is induced at 39 weeks and would have a low threshold to cut an episiotomy with  if similar situation is noted    Promise Pulido MD, 10/06/22, 2:41 AM

## 2022-10-06 NOTE — PROGRESS NOTES
Pt admitted to Mother Baby unit room [id number]  accompanied by significant other. Report received from Huong Hernandez ,[facility]. Initial assessment and vitals completed. Pt introduced to room and plan of care.

## 2022-10-06 NOTE — ANESTHESIA PROCEDURE NOTES
Labor Analgesia  Performed by: Ora Estrella MD  Authorized by: Ora Estrella MD       General Information and Staff    Start Time:  10/5/2022 8:16 PM  End Time:  10/5/2022 8:24 PM  Anesthesiologist:  Ora Esterlla MD  Performed by:   Anesthesiologist  Patient Location:  OB  Site Identification: surface landmarks    Reason for Block: labor epidural    Preanesthetic Checklist: patient identified, IV checked, risks and benefits discussed, monitors and equipment checked, pre-op evaluation, timeout performed, IV bolus, anesthesia consent and sterile technique used      Procedure Details    Patient Position:  Sitting  Prep: ChloraPrep    Monitoring:  Heart rate and continuous pulse ox  Approach:  Midline    Epidural Needle    Injection Technique:  VANDANA air  Needle Type:  Tuohy  Needle Gauge:  17 G  Needle Length:  3.375 in  Needle Insertion Depth:  5.5  Location:  L4-5    Spinal Needle      Catheter    Catheter Type:  End hole  Catheter Size:  19 G  Catheter at Skin Depth:  10  Test Dose:  Negative    Assessment      Additional Comments     Test Dose Given at 2024   Fentanyl 2 mc/ml + Ropivicaine 0.15% epidural infusion 12cc/hr  Fentanyl 50 mcg/mL 100 mcg

## 2022-10-06 NOTE — PLAN OF CARE
Problem: SAFETY ADULT - FALL  Goal: Free from fall injury  Description: INTERVENTIONS:  - Assess pt frequently for physical needs  - Identify cognitive and physical deficits and behaviors that affect risk of falls. - [place] fall precautions as indicated by assessment.  - Educate pt/family on patient safety including physical limitations  - Instruct pt to call for assistance with activity based on assessment  - Modify environment to reduce risk of injury  - Provide assistive devices as appropriate  - Consider OT/PT consult to assist with strengthening/mobility  - Encourage toileting schedule  Outcome: Progressing     Problem: POSTPARTUM  Goal: Long Term Goal:Experiences normal postpartum course  Description: INTERVENTIONS:  - Assess and monitor vital signs and lab values. - Assess fundus and lochia. - Provide ice/sitz baths for perineum discomfort. - Monitor healing of incision/episiotomy/laceration, and assess for signs and symptoms of infection and hematoma. - Assess bladder function and monitor for bladder distention.  - Provide/instruct/assist with pericare as needed. - Provide VTE prophylaxis as needed. - Monitor bowel function.  - Encourage ambulation and provide assistance as needed. - Assess and monitor emotional status and provide social service/psych resources as needed. - Utilize standard precautions and use personal protective equipment as indicated. Ensure aseptic care of all intravenous lines and invasive tubes/drains.  - Obtain immunization and exposure to communicable diseases history. Outcome: Progressing  Goal: Optimize infant feeding at the breast  Description: INTERVENTIONS:  - Initiate breast feeding within first hour after birth. - Monitor effectiveness of current breast feeding efforts. - Assess support systems available to mother/family.  - Identify cultural beliefs/practices regarding lactation, letdown techniques, maternal food preferences.   - Assess mother's knowledge and previous experience with breast feeding.  - Provide information as needed about early infant feeding cues (e.g., rooting, lip smacking, sucking fingers/hand) versus late cue of crying.  - Discuss/demonstrate breast feeding aids (e.g., infant sling, nursing footstool/pillows, and breast pumps). - Encourage mother/other family members to express feelings/concerns, and actively listen. - Educate father/SO about benefits of breast feeding and how to manage common lactation challenges. - Recommend avoidance of specific medications or substances incompatible with breast feeding.  - Assess and monitor for signs of nipple pain/trauma. - Instruct and provide assistance with proper latch. - Review techniques for milk expression (breast pumping) and storage of breast milk. Provide pumping equipment/supplies, instructions and assistance, as needed. - Encourage rooming-in and breast feeding on demand.  - Encourage skin-to-skin contact. - Provide LC support as needed. - Assess for and manage engorgement. - Provide breast feeding education handouts and information on community breast feeding support. Outcome: Progressing  Goal: Establishment of adequate milk supply with medication/procedure interruptions  Description: INTERVENTIONS:  - Review techniques for milk expression (breast pumping). - Provide pumping equipment/supplies, instructions, and assistance until it is safe to breastfeed infant. Outcome: Progressing  Goal: Appropriate maternal -  bonding  Description: INTERVENTIONS:  - Assess caregiver- interactions. - Assess caregiver's emotional status and coping mechanisms. - Encourage caregiver to participate in  daily care. - Assess support systems available to mother/family.  - Provide /case management support as needed.   Outcome: Progressing

## 2022-10-06 NOTE — PLAN OF CARE
Problem: SAFETY ADULT - FALL  Goal: Free from fall injury  Description: INTERVENTIONS:  - Assess pt frequently for physical needs  - Identify cognitive and physical deficits and behaviors that affect risk of falls. - [place] fall precautions as indicated by assessment.  - Educate pt/family on patient safety including physical limitations  - Instruct pt to call for assistance with activity based on assessment  - Modify environment to reduce risk of injury  - Provide assistive devices as appropriate  - Consider OT/PT consult to assist with strengthening/mobility  - Encourage toileting schedule  Outcome: Progressing     Problem: POSTPARTUM  Goal: Long Term Goal:Experiences normal postpartum course  Description: INTERVENTIONS:  - Assess and monitor vital signs and lab values. - Assess fundus and lochia. - Provide ice/sitz baths for perineum discomfort. - Monitor healing of incision/episiotomy/laceration, and assess for signs and symptoms of infection and hematoma. - Assess bladder function and monitor for bladder distention.  - Provide/instruct/assist with pericare as needed. - Provide VTE prophylaxis as needed. - Monitor bowel function.  - Encourage ambulation and provide assistance as needed. - Assess and monitor emotional status and provide social service/psych resources as needed. - Utilize standard precautions and use personal protective equipment as indicated. Ensure aseptic care of all intravenous lines and invasive tubes/drains.  - Obtain immunization and exposure to communicable diseases history. Outcome: Progressing  Goal: Optimize infant feeding at the breast  Description: INTERVENTIONS:  - Initiate breast feeding within first hour after birth. - Monitor effectiveness of current breast feeding efforts. - Assess support systems available to mother/family.  - Identify cultural beliefs/practices regarding lactation, letdown techniques, maternal food preferences.   - Assess mother's knowledge and previous experience with breast feeding.  - Provide information as needed about early infant feeding cues (e.g., rooting, lip smacking, sucking fingers/hand) versus late cue of crying.  - Discuss/demonstrate breast feeding aids (e.g., infant sling, nursing footstool/pillows, and breast pumps). - Encourage mother/other family members to express feelings/concerns, and actively listen. - Educate father/SO about benefits of breast feeding and how to manage common lactation challenges. - Recommend avoidance of specific medications or substances incompatible with breast feeding.  - Assess and monitor for signs of nipple pain/trauma. - Instruct and provide assistance with proper latch. - Review techniques for milk expression (breast pumping) and storage of breast milk. Provide pumping equipment/supplies, instructions and assistance, as needed. - Encourage rooming-in and breast feeding on demand.  - Encourage skin-to-skin contact. - Provide LC support as needed. - Assess for and manage engorgement. - Provide breast feeding education handouts and information on community breast feeding support. Outcome: Progressing  Goal: Establishment of adequate milk supply with medication/procedure interruptions  Description: INTERVENTIONS:  - Review techniques for milk expression (breast pumping). - Provide pumping equipment/supplies, instructions, and assistance until it is safe to breastfeed infant. Outcome: Progressing  Goal: Appropriate maternal -  bonding  Description: INTERVENTIONS:  - Assess caregiver- interactions. - Assess caregiver's emotional status and coping mechanisms. - Encourage caregiver to participate in  daily care. - Assess support systems available to mother/family.  - Provide /case management support as needed.   Outcome: Progressing

## 2022-10-07 VITALS
RESPIRATION RATE: 16 BRPM | HEIGHT: 62 IN | OXYGEN SATURATION: 97 % | DIASTOLIC BLOOD PRESSURE: 77 MMHG | SYSTOLIC BLOOD PRESSURE: 119 MMHG | WEIGHT: 189 LBS | BODY MASS INDEX: 34.78 KG/M2 | TEMPERATURE: 98 F | HEART RATE: 72 BPM

## 2022-10-07 LAB
BASOPHILS # BLD AUTO: 0.08 X10(3) UL (ref 0–0.2)
BASOPHILS NFR BLD AUTO: 0.8 %
EOSINOPHIL # BLD AUTO: 0.49 X10(3) UL (ref 0–0.7)
EOSINOPHIL NFR BLD AUTO: 5 %
ERYTHROCYTE [DISTWIDTH] IN BLOOD BY AUTOMATED COUNT: 14.3 %
HCT VFR BLD AUTO: 36.7 %
HGB BLD-MCNC: 11.9 G/DL
IMM GRANULOCYTES # BLD AUTO: 0.13 X10(3) UL (ref 0–1)
IMM GRANULOCYTES NFR BLD: 1.3 %
LYMPHOCYTES # BLD AUTO: 2.3 X10(3) UL (ref 1–4)
LYMPHOCYTES NFR BLD AUTO: 23.4 %
MCH RBC QN AUTO: 29.5 PG (ref 26–34)
MCHC RBC AUTO-ENTMCNC: 32.4 G/DL (ref 31–37)
MCV RBC AUTO: 90.8 FL
MONOCYTES # BLD AUTO: 0.56 X10(3) UL (ref 0.1–1)
MONOCYTES NFR BLD AUTO: 5.7 %
NEUTROPHILS # BLD AUTO: 6.29 X10 (3) UL (ref 1.5–7.7)
NEUTROPHILS # BLD AUTO: 6.29 X10(3) UL (ref 1.5–7.7)
NEUTROPHILS NFR BLD AUTO: 63.8 %
PLATELET # BLD AUTO: 226 10(3)UL (ref 150–450)
RBC # BLD AUTO: 4.04 X10(6)UL
WBC # BLD AUTO: 9.9 X10(3) UL (ref 4–11)

## 2022-10-07 RX ORDER — IBUPROFEN 600 MG/1
600 TABLET ORAL EVERY 6 HOURS PRN
Qty: 20 TABLET | Refills: 0 | Status: SHIPPED | OUTPATIENT
Start: 2022-10-07

## 2022-10-07 NOTE — PLAN OF CARE
Problem: SAFETY ADULT - FALL  Goal: Free from fall injury  Description: INTERVENTIONS:  - Assess pt frequently for physical needs  - Identify cognitive and physical deficits and behaviors that affect risk of falls. - [place] fall precautions as indicated by assessment.  - Educate pt/family on patient safety including physical limitations  - Instruct pt to call for assistance with activity based on assessment  - Modify environment to reduce risk of injury  - Provide assistive devices as appropriate  - Consider OT/PT consult to assist with strengthening/mobility  - Encourage toileting schedule  Outcome: Completed     Problem: POSTPARTUM  Goal: Long Term Goal:Experiences normal postpartum course  Description: INTERVENTIONS:  - Assess and monitor vital signs and lab values. - Assess fundus and lochia. - Provide ice/sitz baths for perineum discomfort. - Monitor healing of incision/episiotomy/laceration, and assess for signs and symptoms of infection and hematoma. - Assess bladder function and monitor for bladder distention.  - Provide/instruct/assist with pericare as needed. - Provide VTE prophylaxis as needed. - Monitor bowel function.  - Encourage ambulation and provide assistance as needed. - Assess and monitor emotional status and provide social service/psych resources as needed. - Utilize standard precautions and use personal protective equipment as indicated. Ensure aseptic care of all intravenous lines and invasive tubes/drains.  - Obtain immunization and exposure to communicable diseases history. Outcome: Completed  Goal: Optimize infant feeding at the breast  Description: INTERVENTIONS:  - Initiate breast feeding within first hour after birth. - Monitor effectiveness of current breast feeding efforts. - Assess support systems available to mother/family.  - Identify cultural beliefs/practices regarding lactation, letdown techniques, maternal food preferences.   - Assess mother's knowledge and previous experience with breast feeding.  - Provide information as needed about early infant feeding cues (e.g., rooting, lip smacking, sucking fingers/hand) versus late cue of crying.  - Discuss/demonstrate breast feeding aids (e.g., infant sling, nursing footstool/pillows, and breast pumps). - Encourage mother/other family members to express feelings/concerns, and actively listen. - Educate father/SO about benefits of breast feeding and how to manage common lactation challenges. - Recommend avoidance of specific medications or substances incompatible with breast feeding.  - Assess and monitor for signs of nipple pain/trauma. - Instruct and provide assistance with proper latch. - Review techniques for milk expression (breast pumping) and storage of breast milk. Provide pumping equipment/supplies, instructions and assistance, as needed. - Encourage rooming-in and breast feeding on demand.  - Encourage skin-to-skin contact. - Provide LC support as needed. - Assess for and manage engorgement. - Provide breast feeding education handouts and information on community breast feeding support. Outcome: Completed  Goal: Establishment of adequate milk supply with medication/procedure interruptions  Description: INTERVENTIONS:  - Review techniques for milk expression (breast pumping). - Provide pumping equipment/supplies, instructions, and assistance until it is safe to breastfeed infant. Outcome: Completed  Goal: Appropriate maternal -  bonding  Description: INTERVENTIONS:  - Assess caregiver- interactions. - Assess caregiver's emotional status and coping mechanisms. - Encourage caregiver to participate in  daily care. - Assess support systems available to mother/family.  - Provide /case management support as needed.   Outcome: Completed

## 2022-10-07 NOTE — PLAN OF CARE
History & Physical


Date & Time of Service:


Jan 2, 2018 at 14:13


Chief Complaint:


Sob, Here Saturday


Primary Care Physician:


Soham Roche MD


History of Present Illness


Source:  patient, spouse, clinic records, hospital records


Pt is 81 y/o F with PMH paroxysmal a-fib on coumadin, diastolic HF, HTN, 

hyperlipidemia, CINDY using CPAP, CKD III and asthma presented to ER with c/o 

increased SOB. Pt states past 2-3 weeks with cough productive yellow sputum. 

Initially had rhinorrhea and sore throat which has resolved. Reports family 

members with URI's and influenza recently. States feels like has chest 

congestion and yesterday started with SOB with walking which is unusual for 

her. Pt seen in ER on 12/30/17 and had +influenza swab (no tx) and was treated 

for CHF. She reports having to use her albuterol inhaler 3-4 times a day past 2 

days and states does decrease SOB and cough and chest congestion sensation 

however is limited relief. She admits not using her advair as directed and 

states sometimes forgets to use. Denies CP or palpitations. Reports eating and 

drinking normally. Didn't take her lasix this morning. Denies increased LE 

edema. Denies fever/chills, diaphoresis, N/V/D/C, melena, hematochezia, HA, 

dizziness, syncope, vision changes, neck pain, orthopnea, PND, hemoptysis, 

choking, otalgia, abdominal pain, paresthesias, weakness, rashes, urinary 

symptoms.





In ER today: no leukocytosis, K: 3.1, INR: 2.3, Cr: 1.1 (baseline), BNP: 6276, 

negative troponin, AST: 80 (was 52 11/2017), Alk phos: 245 (was 266 11/2017). 

CXR: stable cardiomegaly, mild vascular congestion, trace pleural effusions. EKG

: NSR, rate 62, L ant fascicular block, prolonged QT-533. Was given Tylenol, 

duoneb tx, lasix 80mg IV and pt reports feels less SOB.





Pt report ASA allergy - rash & hives.





Hx ECHO 2015: EF: 60-65%. Follows with Dr Alvarez cardiology.





Past Medical/Surgical History


Medical Problems:


(1) Asthma


Status: Chronic  





(2) Benign hypertension


Status: Chronic  





(3) Bronchitis


Status: Chronic  





(4) CHF (congestive heart failure)


Permanent Comment: diastolic per prior H&P. echo 4/4/2013- EF >70%


Status: Chronic  





(5) Edema


Status: Chronic  





(6) GERD (gastroesophageal reflux disease)


Status: Chronic  





(7) Hiatal hernia


Status: Chronic  





(8) High cholesterol


Status: Chronic  





(9) CINDY on CPAP


Status: Chronic  





(10) Paroxysmal atrial fibrillation


Status: Chronic  





(11) PNA (pneumonia)


Status: Resolved  





(12) s/p elective cardioversion


Permanent Comment: Sept 2013 at [facility] for atrial fibrillation


Status: Chronic  





(13) Seasonal allergies


Status: Chronic  





Surgical Problems:


(1) H/O: hysterectomy


Status: Chronic  





(2) S/P cataract surgery


Status: Chronic  











Family History





Cancer


FH: hypertension


Heart disease





Social History


Smoking Status:  Never Smoker


Smokeless Tobacco Use:  No


Alcohol Use:  none


Drug Use:  none


Marital Status:  


Housing status:  lives with family





Immunizations


History of Influenza Vaccine:  No


Influenza Vaccine Date:  Oct 4, 2012


History of Tetanus Vaccine?:  Unknown


History of Pneumococcal:  No


Pneumococcal Date:  Apr 4, 2003


History of Hepatitis B Vaccine:  Unknown





Multi-Drug Resistant Organisms


History of MDRO:  No





Allergies


Coded Allergies:  


     Alcohol (Verified  Allergy, Mild, RASH, 1/2/18)


 WIPES OR INGESTED


     Cephalosporins (Verified  Allergy, Mild, RASH, 1/2/18)


     Codeine (Verified  Allergy, Mild, RASH, 1/2/18)


     Penicillins (Verified  Allergy, Mild, RASH, 1/2/18)


     Aspirin (Verified  Allergy, Unknown, RASH, 1/2/18)


     Levofloxacin (Verified  Allergy, Unknown, "BONE PAIN", 1/2/18)


     Sulfa Antibiotics (Verified  Allergy, Unknown, UNKNOWN, 1/2/18)





Home Medications


Scheduled


Acetaminophen (Acetaminophen), 2 TAB PO Q8


Amiodarone HCl (Amiodarone HCl), 200 MG PO DAILY


Atorvastatin (Atorvastatin Calcium), 1 TAB PO DAILY


Fluticasone Prop/Salmeterol (Advair Diskus 100/50 60 Dose), 1 PUFF INH BID


Fluticasone Propionate (Nasal) (Flonase Allergy Relief), 2 SPRAY BLADIMIR DAILY


Furosemide (Furosemide), 80 MG PO BID


Metoprolol Succinate (Metoprolol Succinate ER), 12.5 MG PO DAILY


Omeprazole (Prilosec), 20 MG PO DAILY


Potassium Chloride Microencaps (Potassium Chloride Er), 10 MEQ PO BID


Ranitidine (Zantac), 300 MG PO HS


Warfarin Sodium (Warfarin Sodium), 5 MG PO 6XWK


Warfarin Sodium (Warfarin Sodium), 7.5 MG PO WK





Scheduled PRN


Albuterol Sulfate (Proair Respiclick), 2 PUFFS INH Q4H PRN for SOB/Wheezing


Tramadol HCl (Tramadol HCl), 50 MG PO Q4H PRN for Pain





Review of Systems


Constitutional:  No weight loss, No fatigue


Eyes:  No worsening of vision, No eye pain, No redness, No discharge


ENT:  + sore throat, No unusual epistaxis, No trouble swallowing


Respiratory:  + problem reported (see HPI), No dyspnea at rest, No hemoptysis


Cardiovascular:  No chest pain, No orthopnea, No PND


Abdomen:  No pain, No nausea, No vomiting


Musculoskeletal:  No joint pain, No muscle pain, No calf pain


Genitourinary - Female:  No dysuria, No urinary frequency, No urinary urgency, 

No hematuria


Neurologic:  No paralysis, No numbness/tingling, No vertigo


Endocrine:  No excessive thirst, No excessive urination


Hematologic / Lymphatic:  No night sweats


Integumentary:  No rash, No itch





Physical Exam


Vital Signs











  Date Time  Temp Pulse Resp B/P (MAP) Pulse Ox O2 Delivery O2 Flow Rate FiO2


 


1/2/18 13:00  59      


 


1/2/18 12:17  65 22 136/76 92 Room Air  


 


1/2/18 12:10     92 Room Air  


 


1/2/18 11:03     96 Room Air  


 


1/2/18 10:05 36.3 63 20 169/78 92 Room Air  








General Appearance:  no apparent distress (sitting up in chair), + obese


Head:  normocephalic, atraumatic


Eyes:  normal inspection, PERRL, EOMI, sclerae normal


ENT:  hearing grossly normal, pharynx normal, + pertinent finding (moist mucous 

membranes)


Neck:  supple, no JVD, trachea midline


Respiratory/Chest:  chest non-tender, no respiratory distress, no accessory 

muscle use, + crackles (bases bilatrerally, faint expiratory wheezing scattered 

throughout)


Cardiovascular:  regular rate, rhythm, no murmur, normal peripheral pulses


Abdomen/GI:  normal bowel sounds, non tender, soft


Back:  normal inspection, no CVA tenderness


Extremities/Musculoskelatal:  no calf tenderness, normal capillary refill, non-

tender, + pedal edema (1+ bilaterally)


Neurologic/Psych:  alert, normal mood/affect, oriented x 3


Skin:  normal color, warm/dry





Diagnostics


Laboratory Results





Results Past 24 Hours








Test


  1/2/18


10:45 1/2/18


13:38 Range/Units


 


 


White Blood Count 5.08  4.8-10.8  K/uL


 


Red Blood Count 4.70  4.2-5.4  M/uL


 


Hemoglobin 12.4  12.0-16.0  g/dL


 


Hematocrit 39.0  37-47  %


 


Mean Corpuscular Volume 83.0    fL


 


Mean Corpuscular Hemoglobin 26.4  25-34  pg


 


Mean Corpuscular Hemoglobin


Concent 31.8


  


  32-36  g/dl


 


 


Platelet Count 238  130-400  K/uL


 


Mean Platelet Volume 11.0  7.4-10.4  fL


 


Neutrophils (%) (Auto) 65.6   %


 


Lymphocytes (%) (Auto) 22.0   %


 


Monocytes (%) (Auto) 11.6   %


 


Eosinophils (%) (Auto) 0.2   %


 


Basophils (%) (Auto) 0.6   %


 


Neutrophils # (Auto) 3.33  1.4-6.5  K/uL


 


Lymphocytes # (Auto) 1.12  1.2-3.4  K/uL


 


Monocytes # (Auto) 0.59  0.11-0.59  K/uL


 


Eosinophils # (Auto) 0.01  0-0.5  K/uL


 


Basophils # (Auto) 0.03  0-0.2  K/uL


 


RDW Standard Deviation 54.1  36.4-46.3  fL


 


RDW Coefficient of Variation 17.8  11.5-14.5  %


 


Immature Granulocyte % (Auto) 0.0   %


 


Immature Granulocyte # (Auto) 0.00  0.00-0.02  K/uL


 


Prothrombin Time


  23.9


  


  9.0-12.0


SECONDS


 


Prothromb Time International


Ratio 2.3


  


  0.9-1.1  


 


 


Activated Partial


Thromboplast Time 34.8


  


  21.0-31.0


SECONDS


 


Partial Thromboplastin Ratio 1.3   


 


Sodium Level 137  136-145  mmol/L


 


Potassium Level 3.1  3.5-5.1  mmol/L


 


Chloride Level 99    mmol/L


 


Carbon Dioxide Level 33  21-32  mmol/L


 


Anion Gap 5.0  3-11  mmol/L


 


Blood Urea Nitrogen 19  7-18  mg/dl


 


Creatinine


  1.13


  


  0.60-1.20


mg/dl


 


Est Creatinine Clear Calc


Drug Dose 38.9


  


   ml/min


 


 


Estimated GFR (


American) 52.8


  


   


 


 


Estimated GFR (Non-


American 45.5


  


   


 


 


BUN/Creatinine Ratio 16.9  10-20  


 


Random Glucose 87  70-99  mg/dl


 


Calcium Level 8.0  8.5-10.1  mg/dl


 


Total Bilirubin 0.9  0.2-1  mg/dl


 


Aspartate Amino Transf


(AST/SGOT) 80


  


  15-37  U/L


 


 


Alanine Aminotransferase


(ALT/SGPT) 32


  


  12-78  U/L


 


 


Alkaline Phosphatase 245    U/L


 


Troponin I 0.018  0-0.045  ng/ml


 


Pro-B-Type Natriuretic Peptide 6276  0-1800  pg/ml


 


Total Protein 7.2  6.4-8.2  gm/dl


 


Albumin 3.0  3.4-5.0  gm/dl


 


Globulin 4.2  2.5-4.0  gm/dl


 


Albumin/Globulin Ratio 0.7  0.9-2  











Diagnostic Radiology


CXR:


IMPRESSION: 


1. Stable cardiomegaly, mild vascular congestion, and trace pleural effusions


2. Stable explain widening of the right superior mediastinum





EKG


EKG: NSR, rate 62, left anterior fascicular block, t wave inversion noted II, 

III, AVF, V3, V4,V5. prolonged 





EKG read by cardiology:


Normal sinus rhythm


Left anterior fascicular block


Anterior infarct , age undetermined


T wave abnormality, consider inferolateral ischemia


Prolonged QT


Abnormal ECG


When compared with ECG of 30-DEC-2017 10:18,


Left anterior fascicular block is now Present


Inverted T waves have replaced nonspecific T wave abnormality in Inferior leads


Inverted T waves have replaced nonspecific T wave abnormality in Anterior leads 

...


25mm/s 10mm/mV 150Hz 8.0 SP2 12SL 241 SAMSON: 3


Referred by: Referred Self Confirmed By: KWAN SHIRLEY





Impression


Assessment and Plan


ACUTE ON CHRONIC DIASTOLIC HEART FAILURE


Pt with increased SOB past 2 days. Hx chronic diastolic HF. echo 2015: EF: 60-65

%. Pt denies increased LE edema, denies CP. EKG: NSR, rate 62, T wave inversion 

inferior, lateral, L anterior fascicular block, prolonged QT


-lasix 80 IV BID


-trend cardiac enzymes


-EKG prn CP, repeat EKG in am


-ASA allergy reported (hives per pt)


-may consider echo, cardiology consult


-continue to monitor





SOB/ASTHMA/BRONCHITIS


Pt with productive cough x 2-3 weeks with ill contacts. recent +influenza swab 

on 12/30/17. CXR: no infiltrate noted. no leukocytosis.


-xopenex/atrovent nebs


-continue advair


-O2 per protocol


-continue to monitor


-CBC in am





HYPOKALEMIA


K: 3.1 today.


-pending magnesium 


-Potassium 40meq po


-repeat prp 1700 today


-repeat magnesium and cmp in am





HX PAROXYSMAL ATRIAL FIBRILLATION


-sinus rhythm, rate 62 today. On coumadin. INR: 2.3


-continue amiodarone


-continue metoprolol


-continue coumadin


-INR in am





HTN


stable, continue to monitor


-continue metoprolol





GERD


-continue PPI and H2 blocker





HYPERLIPIDEMIA


Lipid panel in 11/2017: total: 311, LDL: 247, HDL: 42, Triglycerides: 108. Her 

atorvastatin was increased at that time


-continue atorvastatin





CINDY


-continue CPAP HS per home settings





CKD III


Cr: 1.1 (baseline 1.1)


-continue to monitor


-avoid nephrotoxic agents when possible





DVT PROPHYLAXIS


-Coumadin





DISPOSITION


-admit tele


-DNR as per discussion with pt


-Follows with Dr oRche for routine care





Pt was seen with Dr Wall. See addendum





Level of Care


Telemetry





Advanced Directives


Existing Living Will:  Yes


Existing Power of :  Yes





Resuscitation Status


DO NOT RESUSCITATE





VTE Prophylaxis


VTE Risk Assessment Done? Y/N:  Yes


Risk Level:  Moderate


Given or contraindicated:  Warfarin (Coumadin)





Note





ATTENDING ADDENDUM





Record reviewed.


Patient interviewed and examined.


Care coordinated with Sandrita Gamboa PA-C.


Please refer to her documentation for patient's history.


Briefly, 81 YO female with history of CHF, atrial fibrillation, hypertension, 

asthma, sleep apnea, and other problems.


Seen in ED on 12/30 with cough and SOB.


Chest x-ray showed mild CHF. Influenza NP swab was positive.


Received IV furosemide in ED with improvement.


Discharged to home.


Returned to ED today with persistent cough and dyspnea.


Cough productive of thick yellow sputum.


No fever.


No change of chronic lower extremity edema.


No chest pain.





EXAM:





General-  elderly female, no acute distress


VS- as noted


Neck-  + JVD


Lungs-  scattered rhonchi, bibasilar rales, diffuse mild-moderate wheezing


Heart- RRR, no gallop appreciated


Abdomen-  + BS, soft, nontender


Extremities-  1+ pretibial edema, no calf tenderness


Neuro-  alert, oriented








DATA:





K 3.1.


WBC 5080.


INR 2.3.


Other lab studies as noted.





Chest x-ray reviewed and demonstrated cardiomegaly, pulmonary vascular 

congestion, no infiltrates, widened mediastinum (noted 12/30/17 and 8/13/17).





EKG performed at 11:23 reviewed and demonstrated NSR at 60 / minute, QTc 533 

msec, left anterior fascicular block, possible age-indeterminate anterior 

infarct, 1 mm ST depression I, II, aVF, inverted or biphasic T-waves in I, II, 

III, aVF, V3-V6.


.








ASSESSMENT AND PLAN:





Cough / dyspnea.


Suspect combination of sinobronchitis and CHF.


IV furosemide for CHF.


Last echo was in 2015; will recheck in light of EKG changes.


Recently documented influenza A.


No apparent indication for antibiotics.


Short course of steroids for bronchospasm.


Chest x-ray shows widened mediastinum, noted on 8/13/17 and 12/30/17.


May be prudent to check CT of chest.


K 3/1- replace orally.





Please refer to GIOVANI Schafer's documentation for discussion of other issues.





Deepak Wall MD


.





Additional Copies To


Soham Roche MD Problem: POSTPARTUM  Goal: Long Term Goal:Experiences normal postpartum course  Description: INTERVENTIONS:  - Assess and monitor vital signs and lab values. - Assess fundus and lochia. - Provide ice/sitz baths for perineum discomfort. - Monitor healing of incision/episiotomy/laceration, and assess for signs and symptoms of infection and hematoma. - Assess bladder function and monitor for bladder distention.  - Provide/instruct/assist with pericare as needed. - Provide VTE prophylaxis as needed. - Monitor bowel function.  - Encourage ambulation and provide assistance as needed. - Assess and monitor emotional status and provide social service/psych resources as needed. - Utilize standard precautions and use personal protective equipment as indicated. Ensure aseptic care of all intravenous lines and invasive tubes/drains.  - Obtain immunization and exposure to communicable diseases history. Outcome: Progressing  Goal: Optimize infant feeding at the breast  Description: INTERVENTIONS:  - Initiate breast feeding within first hour after birth. - Monitor effectiveness of current breast feeding efforts. - Assess support systems available to mother/family.  - Identify cultural beliefs/practices regarding lactation, letdown techniques, maternal food preferences. - Assess mother's knowledge and previous experience with breast feeding.  - Provide information as needed about early infant feeding cues (e.g., rooting, lip smacking, sucking fingers/hand) versus late cue of crying.  - Discuss/demonstrate breast feeding aids (e.g., infant sling, nursing footstool/pillows, and breast pumps). - Encourage mother/other family members to express feelings/concerns, and actively listen. - Educate father/SO about benefits of breast feeding and how to manage common lactation challenges.   - Recommend avoidance of specific medications or substances incompatible with breast feeding.  - Assess and monitor for signs of nipple pain/trauma. - Instruct and provide assistance with proper latch. - Review techniques for milk expression (breast pumping) and storage of breast milk. Provide pumping equipment/supplies, instructions and assistance, as needed. - Encourage rooming-in and breast feeding on demand.  - Encourage skin-to-skin contact. - Provide LC support as needed. - Assess for and manage engorgement. - Provide breast feeding education handouts and information on community breast feeding support. Outcome: Progressing  Goal: Establishment of adequate milk supply with medication/procedure interruptions  Description: INTERVENTIONS:  - Review techniques for milk expression (breast pumping). - Provide pumping equipment/supplies, instructions, and assistance until it is safe to breastfeed infant. Outcome: Progressing  Goal: Appropriate maternal -  bonding  Description: INTERVENTIONS:  - Assess caregiver- interactions. - Assess caregiver's emotional status and coping mechanisms. - Encourage caregiver to participate in  daily care. - Assess support systems available to mother/family.  - Provide /case management support as needed. Outcome: Progressing     Problem: SAFETY ADULT - FALL  Goal: Free from fall injury  Description: INTERVENTIONS:  - Assess pt frequently for physical needs  - Identify cognitive and physical deficits and behaviors that affect risk of falls.   - [place] fall precautions as indicated by assessment.  - Educate pt/family on patient safety including physical limitations  - Instruct pt to call for assistance with activity based on assessment  - Modify environment to reduce risk of injury  - Provide assistive devices as appropriate  - Consider OT/PT consult to assist with strengthening/mobility  - Encourage toileting schedule  Outcome: Progressing

## 2022-10-07 NOTE — PROGRESS NOTES
Discharge order received from MD. Postpartum discharge papers given. Discharge medications reviewed, signed, and given to patient. ID bands match with baby's band. Patient informed when to make follow up appointment with OB. Mother is interacting appropriately with baby. Verbalized understanding of follow up instructions. Discharged in stable condition.

## 2022-10-07 NOTE — DISCHARGE SUMMARY
Deepa Mcclellandsujitoh Patient Status:  inpatient    11/3/1990 MRN [id number]   Location 2215/2215-A Attending [facility]  Praneeth Villalta Day # 2 PCP No primary care provider on file. VAGINAL DELIVERY DISCHARGE SUMMARY     Admit date: 10/5/2022    Discharge date: 10/7/2022     Attending Physician: Sandi Orosco MD     Discharge Diagnoses: Term pregnancy    Procedures: Induction of labor, vaginal delivery with repair of 2nd degree perineal laceration    Complications: None    Hospital Course: Routine postpartum care    Discharged Condition: Stable    Disposition: Home     Current Discharge Medication List    START taking these medications    ibuprofen 600 MG Oral Tab  Take 1 tablet (600 mg total) by mouth every 6 (six) hours as needed for Pain. Qty: 20 tablet Refills: 0      CONTINUE these medications which have NOT CHANGED    Prenatal Vit-Fe Fumarate-FA (PRENATAL OR)  Take by mouth.       STOP taking these medications    Lansoprazole 15 MG Oral Capsule Delayed Release    Acetaminophen (TYLENOL OR)          Diet: General    Activity: Light activity, pelvic rest,  no driving for 1 week    Follow-up:   Sandi Orosco MD  [facility])  [phone]    In 6 weeks  Postpartum Visit

## 2022-10-10 ENCOUNTER — TELEPHONE (OUTPATIENT)
Dept: OBGYN UNIT | Facility: HOSPITAL | Age: 32
End: 2022-10-10

## 2022-10-11 ENCOUNTER — TELEPHONE (OUTPATIENT)
Dept: OBGYN CLINIC | Facility: CLINIC | Age: 32
End: 2022-10-11

## 2022-10-11 NOTE — TELEPHONE ENCOUNTER
Received Prior Auth in [address] [place] [address]     Approved [id number]    3 DAYS      10/5/22 - 10/8/22

## 2022-11-16 ENCOUNTER — POSTPARTUM (OUTPATIENT)
Dept: OBGYN CLINIC | Facility: CLINIC | Age: 32
End: 2022-11-16
Payer: COMMERCIAL

## 2022-11-16 VITALS
DIASTOLIC BLOOD PRESSURE: 60 MMHG | WEIGHT: 159.63 LBS | HEIGHT: 62 IN | SYSTOLIC BLOOD PRESSURE: 106 MMHG | BODY MASS INDEX: 29.38 KG/M2

## 2022-11-16 DIAGNOSIS — Z30.09 ENCOUNTER FOR COUNSELING REGARDING CONTRACEPTION: ICD-10-CM

## 2022-11-16 PROBLEM — Z67.91 RH NEGATIVE STATE IN ANTEPARTUM PERIOD: Status: RESOLVED | Noted: 2022-04-17 | Resolved: 2022-11-16

## 2022-11-16 PROBLEM — Z67.91 RH NEGATIVE STATE IN ANTEPARTUM PERIOD (HCC): Status: RESOLVED | Noted: 2022-04-17 | Resolved: 2022-11-16

## 2022-11-16 PROBLEM — O26.899 RH NEGATIVE STATE IN ANTEPARTUM PERIOD: Status: RESOLVED | Noted: 2022-04-17 | Resolved: 2022-11-16

## 2022-11-16 PROBLEM — O26.899 RH NEGATIVE STATE IN ANTEPARTUM PERIOD (HCC): Status: RESOLVED | Noted: 2022-04-17 | Resolved: 2022-11-16

## 2022-11-16 PROCEDURE — 3078F DIAST BP <80 MM HG: CPT | Performed by: OBSTETRICS & GYNECOLOGY

## 2022-11-16 PROCEDURE — 3074F SYST BP LT 130 MM HG: CPT | Performed by: OBSTETRICS & GYNECOLOGY

## 2022-11-16 PROCEDURE — 3008F BODY MASS INDEX DOCD: CPT | Performed by: OBSTETRICS & GYNECOLOGY

## 2022-11-16 RX ORDER — ACETAMINOPHEN AND CODEINE PHOSPHATE 120; 12 MG/5ML; MG/5ML
0.35 SOLUTION ORAL DAILY
Qty: 84 TABLET | Refills: 3 | Status: SHIPPED | OUTPATIENT
Start: 2022-11-16

## 2022-11-16 NOTE — TELEPHONE ENCOUNTER
Received additional la paperwork from [employer] to return to work   No charge   Sent to [address] [place] [address] for completion

## 2022-11-22 ENCOUNTER — MED REC SCAN ONLY (OUTPATIENT)
Dept: OBGYN CLINIC | Facility: CLINIC | Age: 32
End: 2022-11-22

## 2023-11-25 ENCOUNTER — TELEPHONE (OUTPATIENT)
Facility: CLINIC | Age: 33
End: 2023-11-25

## 2023-11-25 DIAGNOSIS — O20.9 VAGINAL BLEEDING AFFECTING EARLY PREGNANCY: Primary | ICD-10-CM

## 2023-11-25 NOTE — TELEPHONE ENCOUNTER
On call MD    Pt about 4wk pregnant  Had bleeding on toilet paper a few times, now is really minimal/spotting  Pt had miscarriage in the past   Will check HCG levels - ordered

## 2023-11-27 ENCOUNTER — LAB ENCOUNTER (OUTPATIENT)
Dept: LAB | Age: 33
End: 2023-11-27
Attending: STUDENT IN AN ORGANIZED HEALTH CARE EDUCATION/TRAINING PROGRAM
Payer: COMMERCIAL

## 2023-11-27 DIAGNOSIS — O20.9 VAGINAL BLEEDING AFFECTING EARLY PREGNANCY: ICD-10-CM

## 2023-11-27 LAB
B-HCG SERPL-ACNC: 134 MIU/ML
PROGEST SERPL-MCNC: 24 NG/ML

## 2023-11-27 PROCEDURE — 84702 CHORIONIC GONADOTROPIN TEST: CPT

## 2023-11-27 PROCEDURE — 84144 ASSAY OF PROGESTERONE: CPT

## 2023-11-29 ENCOUNTER — LAB ENCOUNTER (OUTPATIENT)
Dept: LAB | Age: 33
End: 2023-11-29
Attending: STUDENT IN AN ORGANIZED HEALTH CARE EDUCATION/TRAINING PROGRAM
Payer: COMMERCIAL

## 2023-11-29 DIAGNOSIS — O20.9 VAGINAL BLEEDING AFFECTING EARLY PREGNANCY: ICD-10-CM

## 2023-11-29 LAB — B-HCG SERPL-ACNC: 275 MIU/ML

## 2023-11-29 PROCEDURE — 84702 CHORIONIC GONADOTROPIN TEST: CPT

## 2023-11-30 ENCOUNTER — TELEPHONE (OUTPATIENT)
Dept: OBGYN CLINIC | Facility: CLINIC | Age: 33
End: 2023-11-30

## 2023-11-30 NOTE — TELEPHONE ENCOUNTER
Appropriate increase of HCG and appropriate progesterone level. Recommend  visit to be scheduled. Please provide precautions.

## 2023-11-30 NOTE — TELEPHONE ENCOUNTER
Pt called regarding her 2 recent HCG. Would like to know if this is appropriate increase and if she should schedule .  Pt called while dr Rand Oliver was on call and ordered these tests due to spotting and her history of loss

## 2023-12-01 NOTE — PROGRESS NOTES
Message left to call back for results & recommendations.  Pt sees MD's at [facility].

## 2023-12-01 NOTE — TELEPHONE ENCOUNTER
Pt scheduled   Future Appointments   Date Time Provider [address]   12/27/2023 11:30 AM [facility] OB US [facility] P [facility] [address]   12/27/2023 12:15 PM Suad Haney MD [facility] OB[facility] P [facility] [address]

## 2023-12-01 NOTE — TELEPHONE ENCOUNTER
LMP: 10/28/2023  EED: 08/03/2024  GA: 4w6d  8 wks: 12/23/2023  : 12/27/23 with Dr. Rodrigue Rocha problems: None  Current medications: None  Past pregnancy complications: Gestational hypertension with first pregnancy    Pain and bleeding precautions given. Discussed spotting/mild cramps during early pregnancy and spotting after intercourse. No further questions. If you experience bleeding that is heavy enough that you are soaking through a large/super tampon or large/overnight pad in an hour or less, if you experience unmanageable/severe pain, have chest pain/chest heaviness, shortness of breath, or feel faint/dizzy, please go to the ER. I don't anticipate you experiencing this, but want you to know what to do just in case.

## 2023-12-27 ENCOUNTER — OFFICE VISIT (OUTPATIENT)
Dept: OBGYN CLINIC | Facility: CLINIC | Age: 33
End: 2023-12-27
Payer: COMMERCIAL

## 2023-12-27 ENCOUNTER — ULTRASOUND ENCOUNTER (OUTPATIENT)
Dept: OBGYN CLINIC | Facility: CLINIC | Age: 33
End: 2023-12-27
Payer: COMMERCIAL

## 2023-12-27 VITALS
DIASTOLIC BLOOD PRESSURE: 64 MMHG | BODY MASS INDEX: 30 KG/M2 | WEIGHT: 162 LBS | HEART RATE: 68 BPM | SYSTOLIC BLOOD PRESSURE: 120 MMHG

## 2023-12-27 DIAGNOSIS — N91.1 SECONDARY AMENORRHEA: Primary | ICD-10-CM

## 2023-12-27 DIAGNOSIS — Z32.01 PREGNANCY EXAMINATION OR TEST, POSITIVE RESULT: ICD-10-CM

## 2023-12-27 PROCEDURE — 76830 TRANSVAGINAL US NON-OB: CPT | Performed by: OBSTETRICS & GYNECOLOGY

## 2023-12-27 PROCEDURE — 99214 OFFICE O/P EST MOD 30 MIN: CPT | Performed by: OBSTETRICS & GYNECOLOGY

## 2023-12-27 PROCEDURE — 76856 US EXAM PELVIC COMPLETE: CPT | Performed by: OBSTETRICS & GYNECOLOGY

## 2023-12-27 PROCEDURE — 3074F SYST BP LT 130 MM HG: CPT | Performed by: OBSTETRICS & GYNECOLOGY

## 2023-12-27 PROCEDURE — 3078F DIAST BP <80 MM HG: CPT | Performed by: OBSTETRICS & GYNECOLOGY

## 2024-01-04 ENCOUNTER — TELEPHONE (OUTPATIENT)
Dept: OBGYN CLINIC | Facility: CLINIC | Age: 34
End: 2024-01-04

## 2024-01-04 ENCOUNTER — NURSE ONLY (OUTPATIENT)
Dept: OBGYN CLINIC | Facility: CLINIC | Age: 34
End: 2024-01-04
Payer: COMMERCIAL

## 2024-01-04 DIAGNOSIS — Z67.91 RH NEGATIVE STATE IN ANTEPARTUM PERIOD: ICD-10-CM

## 2024-01-04 DIAGNOSIS — O20.9 BLEEDING IN EARLY PREGNANCY: Primary | ICD-10-CM

## 2024-01-04 DIAGNOSIS — O26.899 RH NEGATIVE STATE IN ANTEPARTUM PERIOD: ICD-10-CM

## 2024-01-04 PROBLEM — O20.0 THREATENED MISCARRIAGE (HCC): Status: ACTIVE | Noted: 2024-01-04

## 2024-01-04 PROBLEM — O20.0 THREATENED MISCARRIAGE: Status: ACTIVE | Noted: 2024-01-04

## 2024-01-04 PROCEDURE — 96372 THER/PROPH/DIAG INJ SC/IM: CPT | Performed by: OBSTETRICS & GYNECOLOGY

## 2024-01-04 NOTE — TELEPHONE ENCOUNTER
Patient last seen for  on 12/27/23. Has a follow-up US tomorrow to determine early viable versus non-viable pregnancy.     Patient calling with heavy vaginal bleeding. Hx of A - blood type. Has received Rhogam in her past pregnancies.

## 2024-01-04 NOTE — TELEPHONE ENCOUNTER
Patient was in office and informed hr pregnancy was not viable. She is now having heavier bleeding with some cramping and clots. She is RH- and is asking about the Rhogam.  Future Appointments   Date Time Provider Department Center   1/5/2024  1:45 PM [facility] OB US [facility] [place]   1/6/2024 10:45 AM Shantanu Kilgore MD [facility] N [facility] [place]     Please call to advise

## 2024-01-04 NOTE — TELEPHONE ENCOUNTER
Patient calling with c/o vaginal bleeding. States she is wearing a pad due to the flow. Denies any cramping. Pain and bleeding precautions discussed.   Patient will get her Rhogam tomorrow when she comes in for her follow-up US.

## 2024-01-05 ENCOUNTER — ULTRASOUND ENCOUNTER (OUTPATIENT)
Dept: OBGYN CLINIC | Facility: CLINIC | Age: 34
End: 2024-01-05
Payer: COMMERCIAL

## 2024-01-05 DIAGNOSIS — O20.9 VAGINAL BLEEDING AFFECTING EARLY PREGNANCY: Primary | ICD-10-CM

## 2024-01-05 PROCEDURE — 76830 TRANSVAGINAL US NON-OB: CPT | Performed by: OBSTETRICS & GYNECOLOGY

## 2024-01-06 ENCOUNTER — OFFICE VISIT (OUTPATIENT)
Dept: OBGYN CLINIC | Facility: CLINIC | Age: 34
End: 2024-01-06
Payer: COMMERCIAL

## 2024-01-06 VITALS
HEART RATE: 69 BPM | SYSTOLIC BLOOD PRESSURE: 116 MMHG | DIASTOLIC BLOOD PRESSURE: 68 MMHG | BODY MASS INDEX: 30 KG/M2 | WEIGHT: 162 LBS

## 2024-01-06 DIAGNOSIS — O02.1 MISSED ABORTION: Primary | ICD-10-CM

## 2024-01-06 PROCEDURE — 3074F SYST BP LT 130 MM HG: CPT | Performed by: OBSTETRICS & GYNECOLOGY

## 2024-01-06 PROCEDURE — 3078F DIAST BP <80 MM HG: CPT | Performed by: OBSTETRICS & GYNECOLOGY

## 2024-01-06 PROCEDURE — 99213 OFFICE O/P EST LOW 20 MIN: CPT | Performed by: OBSTETRICS & GYNECOLOGY

## 2024-01-06 RX ORDER — ONDANSETRON 4 MG/1
4 TABLET, ORALLY DISINTEGRATING ORAL EVERY 8 HOURS PRN
Qty: 40 TABLET | Refills: 1 | Status: SHIPPED | OUTPATIENT
Start: 2024-01-06

## 2024-01-06 RX ORDER — MISOPROSTOL 200 UG/1
800 TABLET ORAL ONCE
Qty: 4 TABLET | Refills: 0 | Status: SHIPPED | OUTPATIENT
Start: 2024-01-06 | End: 2024-01-06

## 2024-01-06 RX ORDER — IBUPROFEN 600 MG/1
600 TABLET ORAL EVERY 6 HOURS PRN
Qty: 20 TABLET | Refills: 0 | Status: SHIPPED | OUTPATIENT
Start: 2024-01-06

## 2024-01-06 RX ORDER — OXYCODONE HYDROCHLORIDE 5 MG/1
5 TABLET ORAL EVERY 4 HOURS PRN
Qty: 10 TABLET | Refills: 0 | Status: SHIPPED | OUTPATIENT
Start: 2024-01-06

## 2024-01-06 NOTE — PROGRESS NOTES
Subjective:  Chief Complaint   Patient presents with    Follow - Up     [facility] US results      Follow up to ultrasound today showing missed/incomplete miscarriage.      Objective:  /68   Pulse 69   Wt 162 lb (73.5 kg)   LMP 10/28/2023 (Exact Date)   Physical Examination:  General appearance: Well dressed, well nourished in no apparent distress, teary  Neurologic/Psychiatric: Alert and oriented to person, place and time, mood normal, affect appropriate    Ultrasound 24  Summary of Ultrasound Findings:   Ultrasound scan performed transvaginally.  Comparison to previous ultrasound dated 23   Uterus anteverted.  Complex mass measuring approximately 0.92 x 0.82 consistent with missed/incomplete .  Endometrial echo is inhomogeneous with increased color flow.  No identifiable fetal pole or fetal heart tones.  Clinical correlation advised.    Impression:  Missed      A negative    Assessment/Plan:  Missed - Discussed options for management, including expectant management, medical induction and suction D&C.  Discussed risks, benefits and alternatives of each form of management including but not limited to risks of infection, bleeding, injury, management failure, emergent/urgent surgery relating to medical management and uterine adhesions relating to surgical management.  Bleeding precautions given.      Patient desires trial of medical management.  Risks, benefits and alternatives to medical management were discussed including but not limited to risk of heavy vaginal bleeding, severe abdominal pain, failed medical management, prolongation of miscarriage, retained products of conception and emergent services including surgical management with dilation and suction curettage.   Cytotec 800 mcg per vagina x 1 dose.  Motrin 600 mg PO q 6 hr PRN.  Oxycodone 5 for moderate pain.  ED and bleeding precautions provided.   Advised to follow up as needed or two to three weeks post miscarriage if  doing well.  Advised to contact office if initial dose fails, to discuss if repeat dose indicated.    Rh negative- patient has already received rhogam    The patient is   Lab Results   Component Value Date    ABO A 10/06/2022    RH Negative 10/06/2022     Diagnoses and all orders for this visit:    Missed   -     miSOPROStol (CYTOTEC) 200 MCG Oral Tab; Place 4 tablets (800 mcg total) vaginally one time for 1 dose.  -     ibuprofen 600 MG Oral Tab; Take 1 tablet (600 mg total) by mouth every 6 (six) hours as needed for Pain.  -     oxyCODONE 5 MG Oral Tab; Take 1 tablet (5 mg total) by mouth every 4 (four) hours as needed for Pain.  -     ondansetron 4 MG Oral Tablet Dispersible; Take 1 tablet (4 mg total) by mouth every 8 (eight) hours as needed for Nausea.

## 2024-01-06 NOTE — PATIENT INSTRUCTIONS
Misoprostol (Cytotec) Instructions    Once at home, wash hands and place 800 mcg (Four 200 mcg tablets) of misoprostol (Cytotec) as high up in the vagina as possible.    Cramping usually starts 1-7 hours after placement    Bleeding usually starts within 10 hours of placement    Heavy bleeding usually lasts 1-4 hours    Use your Motrin (Ibuprofen) or Oxycodone prescriptions as needed for pain.  Follow instructions on label.    For Nausea, take Benadryl (25-50 mg every 6 hours) or Dramamine (25-50 mg every 6 hours) as needed    Call the office at [phone] if you are soaking more than 2 large maxi pads per hour for more than 2 hours in a row.      Dosing can be repeated in 72 hours if you have not passed the pregnancy.    Follow up 2-3 weeks after your miscarriage.

## 2024-01-08 ENCOUNTER — TELEPHONE (OUTPATIENT)
Dept: OBGYN CLINIC | Facility: CLINIC | Age: 34
End: 2024-01-08

## 2024-01-08 DIAGNOSIS — O02.1 MISSED ABORTION: Primary | ICD-10-CM

## 2024-01-08 RX ORDER — MISOPROSTOL 200 UG/1
800 TABLET ORAL ONCE
Qty: 4 TABLET | Refills: 0 | Status: SHIPPED | OUTPATIENT
Start: 2024-01-08 | End: 2024-01-08

## 2024-01-08 NOTE — TELEPHONE ENCOUNTER
Pt called stating she had a miscarriage and came in 01/6 for a repeated US and there was still tissue according to the us results. Pt said she was prescribed Cytotec to get rid of the rest of the tissue but pt said she did not experiencing no bleeding/cramping after taking medication. Pt also did mention bleeding has gone down and is using a small pad now. Pt is wondering what to do next or if she should get checked out again to make sure everything has passed. Please advise.

## 2024-01-08 NOTE — TELEPHONE ENCOUNTER
Patient was last seen 2024 for a missed .   Follow-up US was completed 24.   Received Rhogam on 2024.    Patient reports she placed the Cytotec (800mg) vaginally on Saturday night.   Reports no increased bleeding or cramping. Is having vaginal bleeding but not as heavy as previously. Does report heavy bleeding from Thursday - Saturday night.     Last HCG was 275 on 23.

## 2024-01-08 NOTE — TELEPHONE ENCOUNTER
Patient notified that repeat dose is recommended at this time.  Follow-up appointment scheduled for this week.  Patient states that she took the first dose on Saturday evening 1/6/24 and was told not to repeat again for at least 72 hours.  Patient would like to clarify if she needs to wait until 8pm on 1/9/2024 to repeat the dose?  Patient prefers to take it ASAP if okay to do so today.

## 2024-01-08 NOTE — TELEPHONE ENCOUNTER
For patients who do not have complete expulsion after a single dose, a second 800 mcg dose can be given after 12 to 24 hours. Therefore, patient can take medication now. Thank you.

## 2024-01-08 NOTE — TELEPHONE ENCOUNTER
Patient can have repeat dose of 800 mcg vaginal.  New prescription for misoprostol 800 mcg vaginal x 1 dose provided.  Please notify patient repeat dose.  Please also advise patient to be seen in the office this week for follow-up especially if repeat dose is not successful.  Please provide precautions.  Thank you.

## 2024-01-10 ENCOUNTER — PATIENT MESSAGE (OUTPATIENT)
Dept: OBGYN CLINIC | Facility: CLINIC | Age: 34
End: 2024-01-10

## 2024-01-12 ENCOUNTER — HOSPITAL ENCOUNTER (OUTPATIENT)
Dept: ULTRASOUND IMAGING | Age: 34
Discharge: HOME | End: 2024-01-12
Attending: NURSE PRACTITIONER
Payer: COMMERCIAL

## 2024-01-12 ENCOUNTER — LAB ENCOUNTER (OUTPATIENT)
Dept: LAB | Age: 34
End: 2024-01-12
Attending: NURSE PRACTITIONER
Payer: COMMERCIAL

## 2024-01-12 ENCOUNTER — OFFICE VISIT (OUTPATIENT)
Dept: OBGYN CLINIC | Facility: CLINIC | Age: 34
End: 2024-01-12
Payer: COMMERCIAL

## 2024-01-12 VITALS
BODY MASS INDEX: 29.9 KG/M2 | DIASTOLIC BLOOD PRESSURE: 76 MMHG | WEIGHT: 162.5 LBS | SYSTOLIC BLOOD PRESSURE: 114 MMHG | HEIGHT: 62 IN

## 2024-01-12 DIAGNOSIS — O02.1 MISSED ABORTION: ICD-10-CM

## 2024-01-12 DIAGNOSIS — O02.1 MISSED ABORTION: Primary | ICD-10-CM

## 2024-01-12 LAB — B-HCG SERPL-ACNC: 197 MIU/ML

## 2024-01-12 PROCEDURE — 76830 TRANSVAGINAL US NON-OB: CPT | Performed by: NURSE PRACTITIONER

## 2024-01-12 PROCEDURE — 99213 OFFICE O/P EST LOW 20 MIN: CPT | Performed by: NURSE PRACTITIONER

## 2024-01-12 PROCEDURE — 3008F BODY MASS INDEX DOCD: CPT | Performed by: NURSE PRACTITIONER

## 2024-01-12 PROCEDURE — 84702 CHORIONIC GONADOTROPIN TEST: CPT

## 2024-01-12 PROCEDURE — 36415 COLL VENOUS BLD VENIPUNCTURE: CPT

## 2024-01-12 PROCEDURE — 76856 US EXAM PELVIC COMPLETE: CPT | Performed by: NURSE PRACTITIONER

## 2024-01-12 PROCEDURE — 3078F DIAST BP <80 MM HG: CPT | Performed by: NURSE PRACTITIONER

## 2024-01-12 PROCEDURE — 3074F SYST BP LT 130 MM HG: CPT | Performed by: NURSE PRACTITIONER

## 2024-01-12 NOTE — PROGRESS NOTES
Subjective:  33 year old    Chief Complaint   Patient presents with    Follow - Up     Miscarriage follow up     Pt here today to follow up on missed   Had US 2024 for vaginal bleeding in early pregnancy  At the time of the US pt felt that bleeding was heavy  Bleeding heavy Thursday - Saturday  Was seen in the office on 2024 and  given misoprostol  Did not feel that she passed tissue  Another dose of misoprostol was used  with no increase in bleeding  Pt here today and notes that her bleeding has stopped  She is tearful, but feels that she has good support at home    Review of Systems:  Pertinent items are noted in the HPI.    Objective:  /76   Ht 62\"   Wt 162 lb 8 oz (73.7 kg)   LMP 10/28/2023 (Exact Date)       Physical Examination:  General appearance: Well dressed, well nourished in no apparent distress  Neurologic/Psychiatric: Alert and oriented to person, place and time, tearful, affect appropriate      Assessment/Plan:    Diagnoses and all orders for this visit:    Missed   -     US PELVIS W EV (CPT=76856/60877); Future, pt desires stat pelvic US. Will go to [facility] now  -     HCG, Beta Subunit, Quant; Future  - plan of care to be determined by ultrasound results  - pain and bleeding precautions given  - emotional support given  - to call with questions or concerns        Return if symptoms worsen or fail to improve.

## 2024-01-16 DIAGNOSIS — O02.1 MISSED ABORTION: Primary | ICD-10-CM

## 2024-01-17 ENCOUNTER — PATIENT MESSAGE (OUTPATIENT)
Dept: OBGYN CLINIC | Facility: CLINIC | Age: 34
End: 2024-01-17

## 2024-01-18 NOTE — TELEPHONE ENCOUNTER
From: Amanda Noel Brauweiler  To: Ellen Childers  Sent: 1/17/2024 11:33 AM CST  Subject: HCG follow up    Good morning,     Thank you for your message regarding the HCG decreasing. Should I repeat that 2 weeks from the draw date (1/12/24) or 2 weeks from your message (1/16/24)?

## 2024-01-18 NOTE — TELEPHONE ENCOUNTER
From: Amanda Noel Brauweiler  To: Carlene Mohan  Sent: 1/10/2024 7:58 PM CST  Subject: Miscarriage management     Hi Dr. Mohan,    I did my second round of misoprostol last night and again did not have any kind of results with it. I wasn’t sure if that would change anything for the appointment I have scheduled for Friday or if I should be doing anything else until then.     Thanks,   Giulia

## 2024-01-28 ENCOUNTER — OFFICE VISIT (OUTPATIENT)
Dept: FAMILY MEDICINE CLINIC | Facility: CLINIC | Age: 34
End: 2024-01-28
Payer: COMMERCIAL

## 2024-01-28 VITALS
BODY MASS INDEX: 27.6 KG/M2 | SYSTOLIC BLOOD PRESSURE: 128 MMHG | WEIGHT: 150 LBS | OXYGEN SATURATION: 96 % | HEIGHT: 62 IN | TEMPERATURE: 98 F | RESPIRATION RATE: 18 BRPM | DIASTOLIC BLOOD PRESSURE: 82 MMHG | HEART RATE: 92 BPM

## 2024-01-28 DIAGNOSIS — J02.0 STREP PHARYNGITIS: Primary | ICD-10-CM

## 2024-01-28 LAB
CONTROL LINE PRESENT WITH A CLEAR BACKGROUND (YES/NO): YES YES/NO
KIT LOT #: ABNORMAL NUMERIC
STREP GRP A CUL-SCR: POSITIVE

## 2024-01-28 PROCEDURE — 3074F SYST BP LT 130 MM HG: CPT | Performed by: NURSE PRACTITIONER

## 2024-01-28 PROCEDURE — 87880 STREP A ASSAY W/OPTIC: CPT | Performed by: NURSE PRACTITIONER

## 2024-01-28 PROCEDURE — 3079F DIAST BP 80-89 MM HG: CPT | Performed by: NURSE PRACTITIONER

## 2024-01-28 PROCEDURE — 99213 OFFICE O/P EST LOW 20 MIN: CPT | Performed by: NURSE PRACTITIONER

## 2024-01-28 PROCEDURE — 3008F BODY MASS INDEX DOCD: CPT | Performed by: NURSE PRACTITIONER

## 2024-01-28 RX ORDER — AMOXICILLIN 500 MG/1
500 CAPSULE ORAL 2 TIMES DAILY
Qty: 20 CAPSULE | Refills: 0 | Status: SHIPPED | OUTPATIENT
Start: 2024-01-28 | End: 2024-02-07

## 2024-01-28 NOTE — PROGRESS NOTES
CHIEF COMPLAINT:     Chief Complaint   Patient presents with    Sore Throat     Entered by patient  Started Friday morning, nasal congestion        HPI:   Amanda Noel Brauweiler is a 33 year old female who presents for a sore throat. Patient reports symptoms started in the past 2-3 days. Denies any fevers, cough,wheezing, chest discomfort, or shortness of breath. .  Treating symptoms with otc meds.   Tolerates PO well at home. No n/v/d.  Denies any other aggravating or relieving factors at home. Denies any other treatment attempts prior to arrival.       Current Outpatient Medications   Medication Sig Dispense Refill    amoxicillin 500 MG Oral Cap Take 1 capsule (500 mg total) by mouth 2 (two) times daily for 10 days. 20 capsule 0    ibuprofen 600 MG Oral Tab Take 1 tablet (600 mg total) by mouth every 6 (six) hours as needed for Pain. 20 tablet 0    oxyCODONE 5 MG Oral Tab Take 1 tablet (5 mg total) by mouth every 4 (four) hours as needed for Pain. (Patient not taking: Reported on 2024) 10 tablet 0    ondansetron 4 MG Oral Tablet Dispersible Take 1 tablet (4 mg total) by mouth every 8 (eight) hours as needed for Nausea. (Patient not taking: Reported on 2024) 40 tablet 1    Prenatal Vit-Fe Fumarate-FA (PRENATAL OR) Take by mouth. (Patient not taking: Reported on 2024)        Past Medical History:   Diagnosis Date    Back problem     scoliosis    Gestational hypertension     Gestational hypertension 2019    34 weeks     (normal spontaneous vaginal delivery) 10/6/2022    Rh negative state in antepartum period 2022    Got rhogam at 21wks [  x] plan for Ab screen at 30-32wks and rhogam again 32-34wks    Visual impairment     contacts/glasses      Past Surgical History:   Procedure Laterality Date    D & C      2021    WISDOM TEETH REMOVED               Social History     Socioeconomic History    Marital status:    Tobacco Use    Smoking status: Never    Smokeless tobacco: Never    Vaping Use    Vaping Use: Never used   Substance and Sexual Activity    Alcohol use: Yes     Comment: occ    Drug use: Never    Sexual activity: Not Currently     Partners: Male   Other Topics Concern    Caffeine Concern Yes    Exercise No    Seat Belt Yes         REVIEW OF SYSTEMS:   GENERAL: Denies fever. Notes good appetite  SKIN: no rashes or abnormal skin lesions  HEENT: + sore throat, Denies sinus symptoms, or ear pain  LUNGS: Denies cough, denies shortness of breath or wheezing,   CARDIOVASCULAR: denies chest pain or palpitations   GI: denies N/V/C or abdominal pain  NEURO: Denies headaches    EXAM:   /82   Pulse 92   Temp 97.6 °F (36.4 °C)   Resp 18   Ht 5' 2\" (1.575 m)   Wt 150 lb (68 kg)   LMP 10/28/2023 (Exact Date)   SpO2 96%   Breastfeeding No   BMI 27.44 kg/m²   GENERAL: well developed, well nourished,in no apparent distress  SKIN: no rashes,no suspicious lesions  HEAD: atraumatic, normocephalic.    EYES: conjunctiva clear,   EARS: TM's intact and without erythema, no bulging, no retraction,no fluid, bony landmarks visualized. No erythema or swelling noted to ear canals or external ears.   NOSE: Nostrils patent, no nasal discharge, nasal mucosa wnl  THROAT: Oral mucosa pink, moist. Posterior pharynx is erythematous. + exudates. Tonsils 2/4 bilat. No trismus. Uvula midline with no swelling. Voice clear/normal. No stridor  NECK: Supple, non-tender  LUNGS: clear to auscultation bilaterally, no rales, wheezes or rhonchi. Breathing is non labored.  CARDIO: RRR without murmur  EXTREMITIES: no cyanosis, clubbing or edema  LYMPH: + ant cerv lymphadenopathy.        ASSESSMENT AND PLAN:       ICD-10-CM    1. Strep pharyngitis  J02.0 Strep A Assay W/Optic     amoxicillin 500 MG Oral Cap          Rapid strep positive  Covid-19 test negative at home.    Discussed physical exam and hpi with pt.  Pt has reassuring physical exam consistent with pharyngitis. Rapid strep positive.  No signs of pta or  retropharyngeal infection.Lungs clear bilat. No respiratory distress noted.Treatment options discussed with patient and explained in detail. We reviewed symptomatic care at home and will start amoxicillin for strep pharyngitis.. The risks, benefits and potential side effects of possible medications were reviewed. Alternatives were discussed. Monitoring parameters and expected course outlined.  Patient to call PCP or go to emergency department if symptoms fail to respond as outlined, or worsen in any way. The patient agreed with the plan.      Patient Instructions   1. Rest. Drink plenty of fluids.  2. Supportive care as discussed.  3. Salt water gargles three times daily  4. Use humidifier at home when possible.  5. The rapid strep test was negative today.   6. Amoxicillin as prescribed.     7. Follow up with PMD in 4-5 days for re-eval. Go to the emergency department immediately if symptoms worsen, change, you develop chest discomfort, wheezing, shortness of breath, or if you have any concerns.        The patient indicates understanding of these issues and agrees to the plan.

## 2024-01-28 NOTE — PATIENT INSTRUCTIONS
1. Rest. Drink plenty of fluids.  2. Supportive care as discussed.  3. Salt water gargles three times daily  4. Use humidifier at home when possible.  5. The rapid strep test was negative today.   6. Amoxicillin as prescribed.     7. Follow up with PMD in 4-5 days for re-eval. Go to the emergency department immediately if symptoms worsen, change, you develop chest discomfort, wheezing, shortness of breath, or if you have any concerns.

## 2024-01-30 ENCOUNTER — LAB ENCOUNTER (OUTPATIENT)
Dept: LAB | Age: 34
End: 2024-01-30
Attending: NURSE PRACTITIONER
Payer: COMMERCIAL

## 2024-01-30 DIAGNOSIS — O02.1 MISSED ABORTION: ICD-10-CM

## 2024-01-30 LAB — B-HCG SERPL-ACNC: 2 MIU/ML

## 2024-01-30 PROCEDURE — 84702 CHORIONIC GONADOTROPIN TEST: CPT

## 2024-04-17 ENCOUNTER — OFFICE VISIT (OUTPATIENT)
Dept: FAMILY MEDICINE CLINIC | Facility: CLINIC | Age: 34
End: 2024-04-17
Payer: COMMERCIAL

## 2024-04-17 VITALS
HEART RATE: 101 BPM | TEMPERATURE: 98 F | SYSTOLIC BLOOD PRESSURE: 102 MMHG | BODY MASS INDEX: 30.95 KG/M2 | HEIGHT: 62 IN | OXYGEN SATURATION: 98 % | DIASTOLIC BLOOD PRESSURE: 68 MMHG | WEIGHT: 168.19 LBS

## 2024-04-17 DIAGNOSIS — Z00.00 WELL ADULT EXAM: Primary | ICD-10-CM

## 2024-04-17 DIAGNOSIS — Z12.4 SCREENING FOR CERVICAL CANCER: ICD-10-CM

## 2024-04-17 DIAGNOSIS — Z31.9 DESIRE FOR PREGNANCY: ICD-10-CM

## 2024-04-17 LAB
ALBUMIN SERPL-MCNC: 4.2 G/DL (ref 3.4–5)
ALBUMIN/GLOB SERPL: 1.2 {RATIO} (ref 1–2)
ALP LIVER SERPL-CCNC: 54 U/L
ALT SERPL-CCNC: 23 U/L
ANION GAP SERPL CALC-SCNC: 3 MMOL/L (ref 0–18)
AST SERPL-CCNC: 18 U/L (ref 15–37)
BASOPHILS # BLD AUTO: 0.03 X10(3) UL (ref 0–0.2)
BASOPHILS NFR BLD AUTO: 0.4 %
BILIRUB SERPL-MCNC: 0.5 MG/DL (ref 0.1–2)
BUN BLD-MCNC: 12 MG/DL (ref 9–23)
CALCIUM BLD-MCNC: 8.9 MG/DL (ref 8.5–10.1)
CHLORIDE SERPL-SCNC: 110 MMOL/L (ref 98–112)
CHOLEST SERPL-MCNC: 106 MG/DL (ref ?–200)
CO2 SERPL-SCNC: 25 MMOL/L (ref 21–32)
CONTROL LINE PRESENT WITH A CLEAR BACKGROUND (YES/NO): YES YES/NO
CREAT BLD-MCNC: 0.99 MG/DL
EGFRCR SERPLBLD CKD-EPI 2021: 78 ML/MIN/1.73M2 (ref 60–?)
EOSINOPHIL # BLD AUTO: 0.18 X10(3) UL (ref 0–0.7)
EOSINOPHIL NFR BLD AUTO: 2.1 %
ERYTHROCYTE [DISTWIDTH] IN BLOOD BY AUTOMATED COUNT: 13.2 %
FASTING PATIENT LIPID ANSWER: YES
FASTING STATUS PATIENT QL REPORTED: YES
GLOBULIN PLAS-MCNC: 3.5 G/DL (ref 2.8–4.4)
GLUCOSE BLD-MCNC: 99 MG/DL (ref 70–99)
HCT VFR BLD AUTO: 44.8 %
HDLC SERPL-MCNC: 46 MG/DL (ref 40–59)
HGB BLD-MCNC: 14.1 G/DL
IMM GRANULOCYTES # BLD AUTO: 0.02 X10(3) UL (ref 0–1)
IMM GRANULOCYTES NFR BLD: 0.2 %
KIT LOT #: NORMAL NUMERIC
LDLC SERPL CALC-MCNC: 46 MG/DL (ref ?–100)
LYMPHOCYTES # BLD AUTO: 1.23 X10(3) UL (ref 1–4)
LYMPHOCYTES NFR BLD AUTO: 14.7 %
MCH RBC QN AUTO: 28.2 PG (ref 26–34)
MCHC RBC AUTO-ENTMCNC: 31.5 G/DL (ref 31–37)
MCV RBC AUTO: 89.6 FL
MONOCYTES # BLD AUTO: 0.43 X10(3) UL (ref 0.1–1)
MONOCYTES NFR BLD AUTO: 5.1 %
NEUTROPHILS # BLD AUTO: 6.5 X10 (3) UL (ref 1.5–7.7)
NEUTROPHILS # BLD AUTO: 6.5 X10(3) UL (ref 1.5–7.7)
NEUTROPHILS NFR BLD AUTO: 77.5 %
NONHDLC SERPL-MCNC: 60 MG/DL (ref ?–130)
OSMOLALITY SERPL CALC.SUM OF ELEC: 286 MOSM/KG (ref 275–295)
PLATELET # BLD AUTO: 315 10(3)UL (ref 150–450)
POTASSIUM SERPL-SCNC: 4.6 MMOL/L (ref 3.5–5.1)
PREGNANCY TEST, URINE: NEGATIVE
PROT SERPL-MCNC: 7.7 G/DL (ref 6.4–8.2)
RBC # BLD AUTO: 5 X10(6)UL
SODIUM SERPL-SCNC: 138 MMOL/L (ref 136–145)
T4 FREE SERPL-MCNC: 0.9 NG/DL (ref 0.8–1.7)
TRIGL SERPL-MCNC: 64 MG/DL (ref 30–149)
TSI SER-ACNC: 1.61 MIU/ML (ref 0.36–3.74)
VLDLC SERPL CALC-MCNC: 9 MG/DL (ref 0–30)
WBC # BLD AUTO: 8.4 X10(3) UL (ref 4–11)

## 2024-04-17 PROCEDURE — 99385 PREV VISIT NEW AGE 18-39: CPT | Performed by: FAMILY MEDICINE

## 2024-04-17 PROCEDURE — 3008F BODY MASS INDEX DOCD: CPT | Performed by: FAMILY MEDICINE

## 2024-04-17 PROCEDURE — 80053 COMPREHEN METABOLIC PANEL: CPT | Performed by: FAMILY MEDICINE

## 2024-04-17 PROCEDURE — 81025 URINE PREGNANCY TEST: CPT | Performed by: FAMILY MEDICINE

## 2024-04-17 PROCEDURE — 84439 ASSAY OF FREE THYROXINE: CPT | Performed by: FAMILY MEDICINE

## 2024-04-17 PROCEDURE — G0438 PPPS, INITIAL VISIT: HCPCS | Performed by: FAMILY MEDICINE

## 2024-04-17 PROCEDURE — 3078F DIAST BP <80 MM HG: CPT | Performed by: FAMILY MEDICINE

## 2024-04-17 PROCEDURE — 80061 LIPID PANEL: CPT | Performed by: FAMILY MEDICINE

## 2024-04-17 PROCEDURE — 3074F SYST BP LT 130 MM HG: CPT | Performed by: FAMILY MEDICINE

## 2024-04-17 PROCEDURE — 96127 BRIEF EMOTIONAL/BEHAV ASSMT: CPT | Performed by: FAMILY MEDICINE

## 2024-04-17 PROCEDURE — 87624 HPV HI-RISK TYP POOLED RSLT: CPT | Performed by: FAMILY MEDICINE

## 2024-04-17 PROCEDURE — 85025 COMPLETE CBC W/AUTO DIFF WBC: CPT | Performed by: FAMILY MEDICINE

## 2024-04-17 PROCEDURE — 88175 CYTOPATH C/V AUTO FLUID REDO: CPT | Performed by: FAMILY MEDICINE

## 2024-04-17 PROCEDURE — 84443 ASSAY THYROID STIM HORMONE: CPT | Performed by: FAMILY MEDICINE

## 2024-04-17 NOTE — PROGRESS NOTES
Chief Complaint   Patient presents with    Physical     Pt is fasting.          HPI  Pt is here for wellness. Planning pregnancy and due to insurnace requests OB referral. No complaints and feels well    ROS  As per HPI and all other systems reviewed and are negative      Past Medical History:    Back problem    scoliosis    Gestational hypertension (HCC)    Gestational hypertension (HCC)    34 weeks     (normal spontaneous vaginal delivery) (HCC)    Rh negative state in antepartum period (HCC)    Got rhogam at 21wks [  x] plan for Ab screen at 30-32wks and rhogam again 32-34wks    Visual impairment    contacts/glasses       Past Surgical History:   Procedure Laterality Date    D & c      2021    [place] teeth removed             Social History     Socioeconomic History    Marital status:    Tobacco Use    Smoking status: Never    Smokeless tobacco: Never   Vaping Use    Vaping status: Never Used   Substance and Sexual Activity    Alcohol use: Yes     Comment: occ    Drug use: Never    Sexual activity: Not Currently     Partners: Male   Other Topics Concern    Caffeine Concern Yes    Exercise No    Seat Belt Yes       Family History   Problem Relation Age of Onset    Hypertension Father     Skin cancer Father     Hypertension Mother     Breast Cancer Mother     Breast Cancer Maternal Grandmother     Skin cancer Maternal Grandfather     Breast Cancer Paternal Grandmother     Skin cancer Maternal Aunt     Stroke Maternal Uncle         Current Outpatient Medications on File Prior to Visit   Medication Sig Dispense Refill    Prenatal Vit-Fe Fumarate-FA (PRENATAL OR) Take by mouth.      ibuprofen 600 MG Oral Tab Take 1 tablet (600 mg total) by mouth every 6 (six) hours as needed for Pain. (Patient not taking: Reported on 2024) 20 tablet 0     No current facility-administered medications on file prior to visit.         Objective  Vitals:    24 0719   BP: 102/68   Pulse: 101   Temp: 98.1 °F  (36.7 °C)   TempSrc: Temporal   SpO2: 98%   Weight: 168 lb 3.2 oz (76.3 kg)   Height: 5' 2\" (1.575 m)     Physical Exam  Constitutional:       Appearance: Normal appearance.   HEENT:      Head: Normocephalic and atraumatic.      Eyes: PERRLA no notable nystagmus     Ears: Normal on observation     Nose: Nose normal.      Mouth: Mucous membranes are moist.      Neck: No masses no bruit  Cardiovascular:      Rate and Rhythm: Normal rate and regular rhythm.   Breasts:       No nipple abnormality or discharge; no axillary or supraclavicular lymph nodes palpable; no skin changes  Pulmonary:      Effort: Pulmonary effort is normal.      Breath sounds: Normal breath sounds.   Abdominal:      General: Bowel sounds are normal.      Palpations: Abdomen is soft. There is no mass.   Pelvis:      Cervix and vagina normal without discharge or notable changes. PAP completed and chaperoned by Trinidad  Musculoskeletal:         General: Normal range of motion.      Cervical back: Normal range of motion.   Skin:     General: Skin is warm and dry.   Neurological:      General: No focal deficit present.      Mental Status: She is alert and oriented to person, place, and time.   Psychiatric:         Mood and Affect: Mood normal.         Thought Content: Thought content normal.       Assessment and Plan  Giulia was seen today for physical.    Diagnoses and all orders for this visit:    Well adult exam  -     ThinPrep PAP Smear; Future  -     Hpv Dna  High Risk , Thin Prep Collect; Future  -     CBC With Differential With Platelet; Future  -     Comp Metabolic Panel (14); Future  -     TSH and Free T4; Future  -     Lipid Panel; Future  -     Urine Preg Test    Screening for cervical cancer  -     ThinPrep PAP Smear; Future  -     Hpv Dna  High Risk , Thin Prep Collect; Future  -     Urine Preg Test    Desire for pregnancy  -     OBG Referral - [facility] ([facility])  -     Urine Preg Test           Follow up  No follow-ups on file.      Patient  Instructions  There are no Patient Instructions on file for this visit.       Ijeoma Chowdhury MD

## 2024-04-18 LAB — HPV I/H RISK 1 DNA SPEC QL NAA+PROBE: NEGATIVE

## 2024-04-23 LAB
.: NORMAL
.: NORMAL

## 2024-05-01 ENCOUNTER — PATIENT MESSAGE (OUTPATIENT)
Dept: FAMILY MEDICINE CLINIC | Facility: CLINIC | Age: 34
End: 2024-05-01

## 2024-05-01 NOTE — TELEPHONE ENCOUNTER
From: Amanda Noel Brauweiler  To: Ijeoma Chowdhury  Sent: 5/1/2024 10:26 AM CDT  Subject: Physical Exam paperwork    Good morning,     My employer is requesting a specific form be signed to verify my recent physical. I've attached the form to this message. If you're able to complete it and send it back to me through [employer] that would be great. If not please let me know and I can drop off a hard copy.     Thank you,   Amanda Brauweiler

## 2024-05-02 ENCOUNTER — MED REC SCAN ONLY (OUTPATIENT)
Dept: FAMILY MEDICINE CLINIC | Facility: CLINIC | Age: 34
End: 2024-05-02

## 2024-05-06 ENCOUNTER — TELEPHONE (OUTPATIENT)
Dept: OBGYN CLINIC | Facility: CLINIC | Age: 34
End: 2024-05-06

## 2024-05-06 DIAGNOSIS — N91.2 AMENORRHEA: Primary | ICD-10-CM

## 2024-05-06 NOTE — TELEPHONE ENCOUNTER
Pregnancy education sent via [facility].   US order pended and routed to provider.

## 2024-05-06 NOTE — TELEPHONE ENCOUNTER
Patient calling to initiate prenatal care  LMP 4/6  Patient is 7-8 weeks on 5/26-6/1  Confirmation Ultrasound and Appointment scheduled on   Future Appointments   Date Time Provider Department Center   6/6/2024  9:45 AM [facility] US [facility] [address]   6/6/2024 10:30 AM Shantanu Kilgore MD [facility] [address]   7/5/2024 10:00 AM Shantanu Kilgore MD [facility] [address]         Any history of ectopic pregnancy? no  Any history of miscarriage? Yes   Any medications that you are taking on a regular basis other than prenatal vitamins? No  (if not taking prenatal vitamins, encourage patient to start taking.)  Any bleeding since the first day of last LMP and your positive pregnancy test? No     [facility]     (0) swallows foods/liquids without difficulty

## 2024-06-05 PROBLEM — Z98.890 HISTORY OF EPISIOTOMY: Status: RESOLVED | Noted: 2022-10-06 | Resolved: 2024-06-05

## 2024-06-05 PROBLEM — O20.0 THREATENED MISCARRIAGE (HCC): Status: RESOLVED | Noted: 2024-01-04 | Resolved: 2024-06-05

## 2024-06-06 ENCOUNTER — OFFICE VISIT (OUTPATIENT)
Dept: OBGYN CLINIC | Facility: CLINIC | Age: 34
End: 2024-06-06
Payer: COMMERCIAL

## 2024-06-06 ENCOUNTER — ULTRASOUND ENCOUNTER (OUTPATIENT)
Dept: OBGYN CLINIC | Facility: CLINIC | Age: 34
End: 2024-06-06
Payer: COMMERCIAL

## 2024-06-06 VITALS
SYSTOLIC BLOOD PRESSURE: 122 MMHG | HEART RATE: 79 BPM | WEIGHT: 170 LBS | HEIGHT: 62 IN | DIASTOLIC BLOOD PRESSURE: 78 MMHG | BODY MASS INDEX: 31.28 KG/M2

## 2024-06-06 DIAGNOSIS — N91.2 AMENORRHEA: ICD-10-CM

## 2024-06-06 DIAGNOSIS — Z34.90 PRENATAL CARE, ANTEPARTUM (HCC): Primary | ICD-10-CM

## 2024-06-06 PROCEDURE — 99213 OFFICE O/P EST LOW 20 MIN: CPT | Performed by: OBSTETRICS & GYNECOLOGY

## 2024-06-06 PROCEDURE — 3008F BODY MASS INDEX DOCD: CPT | Performed by: OBSTETRICS & GYNECOLOGY

## 2024-06-06 PROCEDURE — 3074F SYST BP LT 130 MM HG: CPT | Performed by: OBSTETRICS & GYNECOLOGY

## 2024-06-06 PROCEDURE — 76856 US EXAM PELVIC COMPLETE: CPT | Performed by: OBSTETRICS & GYNECOLOGY

## 2024-06-06 PROCEDURE — 3078F DIAST BP <80 MM HG: CPT | Performed by: OBSTETRICS & GYNECOLOGY

## 2024-06-06 NOTE — PROGRESS NOTES
Subjective:  Patient presents complaining of no menses. Positive home pregnancy test.  LMP 24.  Previous pregnancies uncomplicated vaginal deliveries.  Gestational hypertension with first pregnancy.  No  labor or diabetes.      Objective:  /78   Pulse 79   Ht 62\"   Wt 170 lb (77.1 kg)   LMP 2024 (Approximate)     Physical Examination:  General appearance: Well dressed, well nourished in no apparent distress  Neurologic/Psychiatric: Alert and oriented to person, place and time, mood normal, affect appropriate    Summary of Ultrasound Findings:  Ultrasound scan performed transabdominal.  LMP 24  8w5day by LMP;  8w4day by ultrasound  Viable intrauterine pregnancy  Dates consistent with ultrasound.  Final EDC:  25 by LMP consistent with ultrasound on 2024  Normal ovaries bilaterally.     Assessment/Plan:  Amenorrhea- Normal dating ultrasound  Hx of GHtn- recommend BASA starting 12 wks.  Rh negative- Rhogam 28 wks unless documentation of FOB blood type.  Follow up 3-4 weeks for new OB visit.  Prenatal vitamin with 0.4 mg folate, DHA.  Optional prenatal screening tests reviewed including cfdna, carrier screen/CF, NT/first trimester screen, Quad/AFP, Level 2 ultrasound, amniocentesis/CVS.  Bleeding precautions provided.    Diagnoses and all orders for this visit:    Prenatal care, antepartum (HCC)    Other orders  -     OB/GYNE ULTRASOUND SCAN  -     OB/GYNE ULTRASOUND REPORT         Return for New OB Visit.

## 2024-06-06 NOTE — PATIENT INSTRUCTIONS
Medications Safe in Pregnancy    The following over-the-counter medications may be taken safely after 12 weeks gestation by any patient who is pregnant.  Please follow the instructions on the package for adult dosage.  If you experience any symptoms prior to 12 weeks, please call the office at [phone].  (**these are all safe for you to use now. We ask that you use them sparingly in the first trimester and that you call us with any persistent concerns**)     Colds/Congestion: Flonase, Saline Nasal Spray, Neti Pot, Plain Robitussin, Robitussin DM, Mucinex DM, Vicks 44 E, Vicks Vapor rub, Cough drops without Zinc or Sudafed.   Contact your doctor if you have a persistent fever over 100.4, shortness of breath, coughing up green mucus, or a sore throat that persists from more than 3 days     Diarrhea: Imodium, Maalox Anti-Diarrheal or Kaopectate  Contact the office if you have diarrhea for more than 3 days.     Allergies: Benadryl, Claritin or Zyrtec     Hemorrhoids: Tucks pads, Preparation H, Annusol, Sitz bath or Witch hazel  Eat a high fiber diet and drink plenty of fluids.     Yeast: Monistat 3 or 7  Call if your symptoms do not improve, or if you experience vaginal bleeding, or a watery discharge.     Constipation: Metamucil, Colace, fiber supplement, Milk of Magnesia or Dulcolax  Drink plenty of fluids, eat high-fiber foods and take the above laxatives sporadically.      Headache or Mild aches and pain: Extra Strength Tylenol      Gas: Gas X, Gelusil, Papaya Tablets, Maalox, Mylicon or Mylanta Gas     Heartburn: Tums, Mylanta, Pepcid AC or Maalox     Sore throat: Alcohol free Chloraseptic spray or Lozenges      Nausea and Vomiting: ½ tablet Unisom plus 100mg of Vitamin B6 at bedtime (may increase B6 up to 200mg per day), Madisyn root tea or raspberry leaf tea     Insomnia: Tylenol PM, Vitamin B6 50mg, warm bath or milk, Unisom, Nytol or Sominex.      We have listed a few medications for common symptoms seen  in pregnancy.  Please contact the office if you are unsure about any over the counter medications that are not listed above.       Nausea and vomiting management in pregnancy     1. Half tablet of Unisom and 100 mg of Vitamin B6 at bedtime. Can repeat Vitamin B6 x1 in the morning or during the day  2. Small, frequent meals/snacks- especially important to include good proteins in your evening meal if you are feeling nauseated in the morning. This will keep your stomach full longer and reduce nausea.  3. Ginger tea, ginger ale, ginger chews, peppermint candies, hard candies  4. Keep a snack and or drink at your bedside to have before you even get out of bed in the morning  5. Sea Bands ([email or web address]://[email or web address]/) these can be worn all day/night as needed. The correct spot to wear them is 3 finger widths down from where your wrist bends on the inside     **if you find yourself unable to keep anything down for 24 hours or more, please go to one of the [facility] or the ER so that you can get IV fluids and medications**  Healthy Eating Habits During Pregnancy  It’s important to develop healthy eating habits while you are pregnant, for you as well as for your baby. Here are some ways to stay healthy.   Aim for a healthy weight  A slow, steady rate of weight gain is often best. After the first trimester, you may gain about a pound a week. If you were overweight before pregnancy, you need to gain fewer pounds. Your healthcare provider can give you a healthy weight goal for your pregnancy.   Don’t diet  Now is not the time to diet. You may not get enough of the nutrients you and your baby need. Instead, learn how to be a healthy eater. Start by doing it for your baby. Soon, you may do it for yourself.   Vitamins and supplements  Talk with your healthcare provider about taking these and other prenatal vitamins and supplements.   Iron makes the extra blood you need now.  Calcium and vitamin D help build and  keep strong bones.  Folic acid helps prevent certain birth defects.  Iodine helps the thyroid work right.  Some vitamins may not be safe to take. Your healthcare provider will tell you which ones to avoid.  Fluids  Drink at least 8 to 10 cups of fluid daily. Your baby needs fluids. Fluids also decrease constipation, flush out toxins and waste, limit swelling, and help prevent bladder infections. Water is best. Other good choices are:   Water or seltzer water with a slice of lemon or lime. (These can also help ease an upset stomach.)  Clear soups that are low in salt  Low-fat or fat-free milk, soy or rice milk with calcium added  Popsicles or gelatin  Things to avoid  Some things might harm your growing baby. Don’t eat or drink:   Alcohol  Unpasteurized dairy foods and juices  Raw or undercooked meat, poultry, fish, or eggs  Unwashed fruits and vegetables  Prepared meats, like deli meats or hot dogs, unless heated until steaming hot  Fish that are high in mercury, like shark, swordfish, aneudy mackerel, tilefish, and albacore tuna  Things to limit  Ask your healthcare provider whether it’s safe to eat or drink:   Caffeine  Artificial sweeteners  Organ meats  Certain types of fish  Fish and shellfish that contain mercury in lower amounts, like shrimp, canned light tuna, salmon, pollock, and catfish      Foods to Avoid During Pregnancy  Deli Meats- You may eat deli meats from the deli.  If you eat deli meats in the package, it may be contaminated with Listeria. Heat all deli meats in a package to 165 degrees Fahrenheit before eating, even if the label states the meat is “pre-cooked”.    Soft Cheeses and Unpasteurized Products - You may eat soft cheeses that are pasteurized.  Please avoid all soft cheeses, milk or juice that is unpasteurized.  Fish- avoid fish that contains a high level of mercury. These include but are not limited to; [place], [place] Roughy, Tile Fish, Shark, Swordfish, and Aneudy Mackerel. Please see chart  below for good fish choices in pregnancy. Also avoid any raw, uncooked shellfish such as oysters, clams, or sushi.  Make sure to cook all meats; including ground beef, pork, and poultry to their desired temperatures before consuming. This reduces the chance of a food borne illness.  Caffeine- limit to 200 mg or an 8oz cup daily or less.   Alcohol- no amount of alcohol is determined to be safe in pregnancy. Do not drink any alcoholic beverages while pregnant.            [facility]- Prenatal Testing    The following information is designed to help you understand some of the optional tests that are available to you to screen for problems in your pregnancy.  Before considering any of these tests, you will need to consider the following questions:    [1] What would you do if an abnormality were detected?  If the answer is nothing, then you may decide to decline all testing.  [2] Would you be willing to undergo testing which might increase your risk of miscarriage, such as an amniocentesis or CVS (see below)?  [3] If you have the initial testing, and it indicates that there might be a problem, and you subsequently decide not to do invasive testing such as an amniocentesis, would you worry excessively through the remainder of the pregnancy?    The following tests are available to screen for problems in your pregnancy:    [1]  First Trimester Screening (also called Nuchal Thickness, Nuchal Translucency or NT)- This is an abdominal ultrasound done between 10 and 14 weeks by a perinatology specialist (Maternal Fetal Medicine, MFM) which measures the thickness of the skin behind your baby's neck.  Increased thickness of the skin in this area has been linked to an increased risk of genetic abnormalities like Down Syndrome.  A second visit may be required if the baby is not in the correct position.  You will need to schedule an appointment with the Maternal Fetal Medicine office.  Address and phone number  below:  Please verify insurance coverage:  CPT code: 53522 (regalado) or 55264 (twins)  Diagnosis: Genetic screening- Z36.8A  Maternal Fetal Medicine  Dr. Adams, Dr. Roberto, Dr. Aranda, and Dr. Arechiga  [address] [place] [address] [address] [place] [address]  Phone [phone]  Fax [phone]    [2] Cell-Free DNA testing (NIPT)- This is a blood test done any time after 10-11 weeks which screens for genetic abnormalities like Down Syndrome.  It is greater than 98% sensitive but requires an amniocentesis for confirmation if abnormal.  It can also tell you the sex of the baby.  Please see the included pamphlet for more information about cost and billing options.  CPT code: 72731  Diagnosis code: Genetic screening- 36.8A    [3] Quad Screen (also called AFP-Plus or Tetra Screen)- This is a blood test done which screens for both genetic abnormalities like Down Syndrome and neural tube defects (NTD's), such as spina bifida (an abnormal opening in the spine which can cause paralysis).  It is usually performed around 16-20 weeks.  If the Quad screen comes back abnormal, it does not mean that your baby definitely has an abnormality.  It only means that there is an increased risk of an abnormality.  Additional testing such as a Level II ultrasound or amniocentesis may be recommended (see below).  This test is less accurate at picking up genetic abnormalities than the cell-free DNA test.  If you have test #1 or 2, then usually only the AFP part of the Quad screen would be performed to screen for NTD's (see below).    [4]  Alpha Fetoprotein (AFP, MSAFP)- This is a simple blood test that screens for neural tube defects such as spina bifida (an abnormal opening in the spine).  It is usually performed around 16-20 weeks.  Additional testing such as a Level II ultrasound may be recommended for an abnormal test result.  Please verify insurance coverage:  CPT code: 94965 Diagnosis code: Genetic Screening- Z36.8A    [5] Cystic Fibrosis/SMA  Carrier/Fragile X Carrier Screening- Cystic Fibrosis is a genetic disease which causes lung problems in the infant.  SMA (spinal muscular atrophy) is a genetic disease that affects the nerve cells of the spinal cord.  Fragile X is the most common cause of mental disabilities.  These genetic defects would need to be passed from both parents to the child.  A blood test is performed on the mother, and if her test is positive, then the father should also be tested. These tests can be done at any time in the pregnancy, usually in the first trimester with your initial OB labs.  All babies are screened for cystic fibrosis after birth.  Please verify insurance coverage:  Cystic Fibrosis CPT Code: 01345 Diagnosis code: Cystic Fibrosis screening- Z13.228  SMA CPT Code: 38043 Diagnosis code: Genetic Screening- Z36.8A or Testing for Genetic Disease Carrier- Z13.71    [6]  Level II Ultrasound-  This is an abdominal ultrasound done at approximately 20-21 weeks by a perinatology specialist ([facility]) at [facility] which looks at many of the baby's internal structures.  Abnormalities in certain structures have been associated with an increased risk of genetic abnormalities.  It also screens for NTD's.  This ultrasound would replace the Level I Ultrasound that we normally perform at our office around the same time.    [7]  Amniocentesis-  During this procedure, a needle is passed through your abdominal wall to withdrawal some amniotic fluid from around the baby.  It screens for both genetic abnormalities and NTD's and is usually performed around 15-16 weeks.  This test has the highest accuracy for detecting genetic abnormalities, but there may be a small risk of miscarriage or other complications.  A similar procedure called Chorionic Villus Sampling (CVS) is performed by passing a catheter through the vagina to remove a small sample of tissue from the placenta (afterbirth).  CVS may have a higher risk of miscarriage and other rare  complications compared to amniocentesis but can be performed earlier in pregnancy.  Amniocentesis is often recommended/offered if any of the previously mentioned tests come back abnormal.  A pamphlet is available if you would like more information about this option.  If you decide to have an amniocentesis, the other tests would be unnecessary.      The above information covers some of the basics.  Pamphlets on the Cell-Free DNA test, Quad Screen and Cystic Fibrosis test should be in your prenatal packet.  Your doctor will discuss this information in more detail, and feel free to ask additional questions.  Also, remember that all of these tests are optional, and will only be performed at your request.  Testing that needs to be done through the perinatologist's office may require 2-3 weeks lead time to schedule.              PRENATAL INSTRUCTIONS    These prenatal instructions are specific to the care of our patients and supplement the books and pamphlets you are receiving.  The physicians and office staff are also available to answer questions not covered in this material.  We function as a group practice with physicians alternating call for deliveries.  For this reason you should see each of the doctors at least once during your pregnancy.  We provide continuous coverage of our patients and are committed to competent and concerned patient care.    PRENATAL CARE  Because of the importance of good prenatal care, you will have monthly office visits until the 28th week of pregnancy, then every two weeks for the next eight weeks and weekly during the final month.  Certain conditions may require more frequent visits.    MEDICATION  You will be given a prescription for prenatal vitamins with DHA to be taken until your six week postpartum visit or until you stop breastfeeding, whichever lasts longer.  These vitamins can occasionally exacerbate stomach irritation or constipation;  please contact us if you are having  difficulties tolerating your vitamin.  Should you become anemic during your pregnancy, you may receive a supplemental iron tablet to correct the condition.  Unnecessary medication should be avoided, but you may take Tylenol (Acetaminophen, regular or extra-strength) for ordinary headaches and Tums, Mylanta or Maalox for heartburn.  For cough or cold symptoms you may try Contac, Benadryl, Tylenol Sinus, Chlor-Trimeton, Actifed, Sudafed or plain Robitussin.  For constipation drink 6 to 8 glasses of water each day and increase your roughage intake with foods such as lettuce, bran, wheat bread and green vegetables.  You may take Metamucil, Colace or Senekot if the above do not work.  Please call the office to confirm the safety of any other prescription, over-the-counter or herbal medications.\Alcohol intake and tobacco should be avoided during pregnancy.  Caffeinated beverages (coffee, tea and soft drinks) should be limited to no more than 2 cups daily.  Pregnant women should consume the following each day through diet or supplements:  o Folic acid 400-800 micrograms   o Iron 30 mg (or be screened for anemia)  o Vitamin D 600 international units  o Calcium 1,000 mg    NUTRITION/HEALTH  Gaining weight is the easiest thing for a pregnant woman to do.  A well balanced diet of lean meat, fish, poultry, non-starchy vegetables, fruit and salads should accomplish an average weight gain of 2 to 4 pounds a month.  Avoid raw or undercooked fish or meats.  If you have deli meat, please microwave it or cook on the stove until steaming.  Soft cheeses (Camembert, Blue, Roquefort, Goat), all non-pasteurized foods, and large game fish (Shark, Swordfish, Aneudy Mackerel, Tilefish, [place]) should be avoided, because of concerns about mercury exposure.  Other fish including canned light tuna should be limited to 2 servings per week.  Albacore (“white”) tuna should be limited to once per week.  Avoid cigarette smoking, alcohol, drugs, and  vaping.  If you have deli meat, please microwave it or cook on the stove until steaming.  Caffeine: Low-to-moderate caffeine intake in pregnancy does not appear to be associated with any adverse outcomes. Pregnant women may have caffeine but should probably limit it to less than 300 mg/d (a typical 8-ounce cup of brewed coffee has approximately 130 mg of caffeine. An 8-ounce cup of tea or 12-ounce soda has approximately 50 mg of caffeine), but exact amounts vary based on the specific beverage or food.  Avoid hot tub use.  Hair dye is presumed safe, but there are no randomized trials.  Insect Repellants: Topical insect repellants (including DEET) can be used in pregnancy and should be used in areas with high risk for insect-borne illnesses.  Recommended weight gain goals:   Pre-pregnancy BMI     Recommended Weight Gain   Underweight < 18.5     28-40 lbs   Normal 18.5 - 24.9     25-35 lbs   Overweight 25 - 29.9     15-25 lbs   Obese 30 or greater     11-20 lbs    PHYSICAL ACTIVITY  We encourage our patients to continue physical activities and exercise during their pregnancy.  We recommend activities such as swimming, stationary biking, walking, low-impact aerobics, tennis, and golf.  Other activities such as diving, bicycle riding, water/snow-skiing, horseback riding and flying in small aircraft should be avoided.  If you choose to exercise, your heart rate should not increase to more than 140 beats per minute or 70% of your maximum heart rate.  We recommend that you exercise for no more than 30 minutes at one time.  Saunas, Jacuzzis and tanning beds should be avoided.  Sexual intercourse is permitted in the absence of bleeding,  labor or any other complications of pregnancy.    IF YOU ARE INVOLVED IN A  MINOR CAR ACCIDENT OR HAVE A MINOR FALL, please contact our doctor on call at [phone].  Your phone call will transfer to the answering service if the office is closed.    [facility]  sponsors regular childbirth classes and we strongly recommend a class for both expectant parents.  Other popular classes include Breastfeeding, Infant CPR and Infant Care.  For more information go to [email or web address]--> Services-->Pregnancy and baby-->Tour, Classes and Events.      LABOR (at term, 37 weeks or more)  Weekdays, please call the Office at [phone].  At night and on weekends/holidays, please call Labor & Delivery at [phone].  If you have any of the following symptoms: contractions 5 to 6 minutes apart for one hour, or sooner if you have a history of fast labors, broken bag of water with or without contractions (gush or persistent slow leakage of fluid), any vaginal bleeding other than a slight bloody show following an internal exam or light blood-tinged mucous discharge, you should go directly to Labor & Delivery at [facility].  The Labor & Delivery staff will contact the doctor on call and notify him or her of your labor status. AVOID HEAVY MEALS IF YOU SUSPECT THE ONSET OF LABOR     LABOR (less than 37 weeks)  Please call the office at [phone] right away if you experience contractions more frequently than every 10 to 15 minutes for more than an hour, or any unusual low back pain or pelvic pressure.  Also use this number if you need to speak to the physician about any urgent medical problems after business hours.    HOSPITAL  The delivery of your baby will take place at [facility], located at [address] [place] [address].      PEDIATRICIAN  You will need to choose a pediatrician or family practitioner to take care of your baby both while you are in the hospital and after you go home.  If the physician you choose is not on staff at [facility], you will also need to select a physician to see the baby while you are in the hospital.  Please make sure that any physician you choose accepts your insurance plan and is accepting new patients.  Feel free to ask us for a  recommendation if you are not familiar with the doctors in this area.    DELIVERY  There are three types of anesthetics available for use during labor: short acting narcotics (Stadol, Nubain) given through your IV, local anesthetics injected to numb your perineum during delivery, and the epidural injection to numb you from the waist down during labor and delivery. The above choices are individualized, and we will work with you to make your labor experience as comfortable as possible.  Communication is extremely important during this process, so we encourage you to ask questions and discuss any concerns.  We believe childbirth is a natural process, and we hope to make your experience as wonderful as possible!

## 2024-06-11 ENCOUNTER — OFFICE VISIT (OUTPATIENT)
Dept: FAMILY MEDICINE CLINIC | Facility: CLINIC | Age: 34
End: 2024-06-11
Payer: COMMERCIAL

## 2024-06-11 VITALS
HEIGHT: 62 IN | WEIGHT: 168 LBS | DIASTOLIC BLOOD PRESSURE: 70 MMHG | HEART RATE: 81 BPM | OXYGEN SATURATION: 99 % | BODY MASS INDEX: 30.91 KG/M2 | TEMPERATURE: 98 F | SYSTOLIC BLOOD PRESSURE: 120 MMHG | RESPIRATION RATE: 16 BRPM

## 2024-06-11 DIAGNOSIS — J02.9 SORE THROAT: Primary | ICD-10-CM

## 2024-06-11 LAB
CONTROL LINE PRESENT WITH A CLEAR BACKGROUND (YES/NO): YES YES/NO
KIT LOT #: NORMAL NUMERIC
STREP GRP A CUL-SCR: NEGATIVE

## 2024-06-11 PROCEDURE — 3074F SYST BP LT 130 MM HG: CPT | Performed by: NURSE PRACTITIONER

## 2024-06-11 PROCEDURE — 3008F BODY MASS INDEX DOCD: CPT | Performed by: NURSE PRACTITIONER

## 2024-06-11 PROCEDURE — 99212 OFFICE O/P EST SF 10 MIN: CPT | Performed by: NURSE PRACTITIONER

## 2024-06-11 PROCEDURE — 87880 STREP A ASSAY W/OPTIC: CPT | Performed by: NURSE PRACTITIONER

## 2024-06-11 PROCEDURE — 3078F DIAST BP <80 MM HG: CPT | Performed by: NURSE PRACTITIONER

## 2024-06-11 NOTE — PROGRESS NOTES
CHIEF COMPLAINT:    Chief Complaint   Patient presents with    Sore Throat       HISTORY OF PRESENT ILLNESS:    Giulia who is 9 weeks pregnant presents today, 2024, for sore throat that began 3 days ago.  Initially pain rated 8/10.  Today rates pain 4/10.  Reports feeling significantly better today.  Would like to be tested for strep as precaution.  Positive for intermittent headaches and \"dry annoying cough.\"  Denies chest pain or cough.  Denies any other concerns regarding health.  Reinforced adequate hydration and consistent use of prenatal supplements.    ALLERGIES:  No Known Allergies    CURRENT MEDICATIONS:  Current Outpatient Medications   Medication Sig Dispense Refill    Prenatal Vit-Fe Fumarate-FA (PRENATAL OR) Take by mouth.         MEDICAL HISTORY:  Past Medical History:    Back problem    scoliosis    Gestational hypertension (HCC)    Gestational hypertension (HCC)    34 weeks     (normal spontaneous vaginal delivery) ([facility])    Rh negative state in antepartum period (HCC)    Got rhogam at 21wks [  x] plan for Ab screen at 30-32wks and rhogam again 32-34wks    Visual impairment    contacts/glasses     Past Surgical History:   Procedure Laterality Date    D & c      2021    [place] teeth removed           Family History   Problem Relation Age of Onset    Hypertension Father     Skin cancer Father     Hypertension Mother     Breast Cancer Mother     Breast Cancer Maternal Grandmother     Skin cancer Maternal Grandfather     Breast Cancer Paternal Grandmother     Skin cancer Maternal Aunt     Stroke Maternal Uncle      Family Status   Relation Status    Fa Alive    Mo Alive    MGMA (Not Specified)    MGFA (Not Specified)    PGMA (Not Specified)    Mat Aunt (Not Specified)    Maternal Unc Alive     Social History     Socioeconomic History    Marital status:    Tobacco Use    Smoking status: Never    Smokeless tobacco: Never   Vaping Use    Vaping status: Never Used   Substance  and Sexual Activity    Alcohol use: Not Currently     Comment: occ    Drug use: Never    Sexual activity: Yes     Partners: Male   Other Topics Concern    Caffeine Concern Yes    Exercise No    Seat Belt Yes       ROS:  GENERAL:  Denies recorded temperatures greater than 100.5F  RESPIRATORY:  Denies difficulty breathing  CARDIAC:  Denies chest pain with exertion    VITALS:   /70   Pulse 81   Temp 98.3 °F (36.8 °C) (Temporal)   Resp 16   Ht 5' 2\" (1.575 m)   Wt 168 lb (76.2 kg)   LMP 04/06/2024 (Approximate)   SpO2 99%   BMI 30.73 kg/m²     Reviewed by Eladia Junior MS, APRN, FNP-BC    PHYSICAL EXAM:    Constitutional:       Appears well.  Sitting upright on exam table.  Well developed, well nourished, and in no acute distress  HEENT:      Facial features symmetric. Normocephalic and atraumatic  Mouth:        Buccal mucosa is moist and pink.  No ulcerations or lesions.  Uvula rises midline.        Posterior pharynx is erythematous.        Grade 2+ tonsillar enlargement,  Cardiovascular:      Heart sounds: Regular rate and rhythm without murmur      No edema of BLE  Pulmonary:      Chest expansion symmetric.  Breathing nonlabored.   Musculoskeletal:         Movements smooth and controlled with appropriate coordination.       Gait is steady, nonantalgic.  Neuro:       No focal deficits, cranial nerves grossly intact.       Movements smooth and controlled, appropriate coordination without ataxia or tremors.  Skin:     Warm and dry without jaundice or rashes.  Psychiatric:         Alert and oriented.  Calm and cooperative.  Speech is clear.     ASSESSMENT & PLAN:    1. Sore throat  Negative  - Rapid Strep

## 2024-07-05 ENCOUNTER — INITIAL PRENATAL (OUTPATIENT)
Dept: OBGYN CLINIC | Facility: CLINIC | Age: 34
End: 2024-07-05
Payer: COMMERCIAL

## 2024-07-05 VITALS
DIASTOLIC BLOOD PRESSURE: 64 MMHG | WEIGHT: 167.25 LBS | HEIGHT: 62 IN | BODY MASS INDEX: 30.78 KG/M2 | SYSTOLIC BLOOD PRESSURE: 118 MMHG | HEART RATE: 82 BPM

## 2024-07-05 DIAGNOSIS — Z34.90 PRENATAL CARE, ANTEPARTUM (HCC): Primary | ICD-10-CM

## 2024-07-05 DIAGNOSIS — Z36.9 ANTENATAL SCREENING ENCOUNTER (HCC): ICD-10-CM
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
## 2024-07-05 PROCEDURE — 87086 URINE CULTURE/COLONY COUNT: CPT | Performed by: OBSTETRICS & GYNECOLOGY

## 2024-07-05 PROCEDURE — 3008F BODY MASS INDEX DOCD: CPT | Performed by: OBSTETRICS & GYNECOLOGY

## 2024-07-05 PROCEDURE — 3078F DIAST BP <80 MM HG: CPT | Performed by: OBSTETRICS & GYNECOLOGY

## 2024-07-05 PROCEDURE — 3074F SYST BP LT 130 MM HG: CPT | Performed by: OBSTETRICS & GYNECOLOGY

## 2024-07-05 RX ORDER — ASPIRIN 81 MG/1
81 TABLET ORAL DAILY
COMMUNITY

## 2024-07-05 NOTE — PROGRESS NOTES
Subjective:  Chief Complaint   Patient presents with    Prenatal Care     NOB     33 year old  female    presents for new OB visit    Patient's last menstrual period was 2024 (approximate). Estimated Date of Delivery: 25   Patient without complaints since finding out she is pregnant.  She is taking an OTC PNV with DHA.  She has no concerns regarding this pregnancy.  Previous pregnancy uncomplicated vaginal deliveries with hx of gestational hypertension.  No  labor or diabetes.      OB History    Para Term  AB Living   5 2 2 0 2 2   SAB IAB Ectopic Multiple Live Births   2 0 0 0 2      # Outcome Date GA Lbr Dyllan/2nd Weight Sex Type Anes PTL Lv   5 Current            4 SAB 24 6w0d    SAB      3 Term 10/06/22 40w2d 08:38 / 01:36 8 lb 6.8 oz (3.82 kg) M NORMAL SPONT EPI N ROSITA      Complications: Variable decelerations   2 SAB 10/08/21     SAB      1 Term 19 38w0d  6 lb 3.2 oz (2.812 kg) F NORMAL SPONT EPI N ROSITA       Past Medical History:    Back problem    scoliosis    Gestational hypertension (HCC)    Gestational hypertension (HCC)    34 weeks     (normal spontaneous vaginal delivery) (HCC)    Rh negative state in antepartum period (HCC)    Got rhogam at 21wks [  x] plan for Ab screen at 30-32wks and rhogam again 32-34wks    Visual impairment    contacts/glasses     Past Surgical History:   Procedure Laterality Date    D & c      2021    [place] teeth removed             Medications:  Current Outpatient Medications on File Prior to Visit   Medication Sig Dispense Refill    aspirin 81 MG Oral Tab EC Take 1 tablet (81 mg total) by mouth daily.      Prenatal Vit-Fe Fumarate-FA (PRENATAL OR) Take by mouth.       No current facility-administered medications on file prior to visit.       Allergies:  No Known Allergies    Social History:  Social History     Tobacco Use    Smoking status: Never    Smokeless tobacco: Never   Substance Use Topics    Alcohol use:  Not Currently     Comment: occ       Family History:  Family History   Problem Relation Age of Onset    Hypertension Father     Skin cancer Father     Hypertension Mother     Breast Cancer Mother     Breast Cancer Maternal Grandmother     Skin cancer Maternal Grandfather     Breast Cancer Paternal Grandmother     Skin cancer Maternal Aunt     Stroke Maternal Uncle        Review of Systems:  Pertinent items are noted in HPI.    Objective:  /64   Pulse 82   Ht 62\"   Wt 167 lb 4 oz (75.9 kg)   LMP 2024 (Approximate)   BMI 30.59 kg/m²    Physical Examination:  General appearance: Well dressed, well nourished in no apparent distress  Neurologic/Psychiatric: Alert and oriented to person, place and time, mood normal, affect appropriate  Head: Normocephalic without obvious deformity, atraumatic  Neck: No thyromegaly, supple, non-tender, no masses, no adenopathy  Lungs: Clear to auscultation bilaterally, no rales, wheezes or rhonchi  Heart:: Regular rate and rhythm, no gallops or murmurs  Abdomen: Soft, non-tender, non-distended, no masses, no hepatosplenomegaly, no hernias, no inguinal lymphadenopathy  Extremities: Non-tender, full range of motion, no clubbing, cyanosis or edema  Skin:  General inspection- no rashes, lesions or discoloration      Assessment:  33 year old  EGA 12w6d LOS 25  Amenorrhea, positive pregnancy test.  Addressed patients concerns regarding pregnancy.  Current problems for this pregnancy:   Hx of GHtn- BASA now through 36 wks.  Rh negative- Rhogam 28 wks.    Plan:  Pap smear UTD.  Order for prenatal labs given.  Patient consents to HIV testing, counseled and cleared for release of HIV test results to patient.  Discussed optional prenatal screening tests including cfDNA, CF/carrier screen, NT/first trimester screen, Quad Screen/AFP, Level II ultrasound and amniocentesis/CVS.  Had carrier previously.  Desires Quad screen  Prenatal vitamins with DHA.  New OB education  completed.  Vaccine recommendations reviewed including Pertussis 26-37 weeks, flu vaccine any trimester, Covid vaccine and booster.  Discussed diet, exercise, travel, food and medication safety.  Information sheets on prenatal screening tests, vaccine recommendations, nausea in pregnancy and prenatal care given.    Patient offered chaperone for exam, accepted    Diagnoses and all orders for this visit:    Prenatal care, antepartum (HCC)     screening encounter (HCC)  -     HCV Antibody; Future  -     PRENATAL PROFILE 1; Future  -     Urine Culture, Routine; Future      Return in about 4 weeks (around 2024) for Routine Prenatal Visit.

## 2024-07-10 ENCOUNTER — LAB ENCOUNTER (OUTPATIENT)
Dept: LAB | Age: 34
End: 2024-07-10
Attending: OBSTETRICS & GYNECOLOGY
Payer: COMMERCIAL

## 2024-07-10 DIAGNOSIS — Z36.9 ANTENATAL SCREENING ENCOUNTER (HCC): ICD-10-CM

## 2024-07-10 LAB
ANTIBODY SCREEN: NEGATIVE
BASOPHILS # BLD AUTO: 0.05 X10(3) UL (ref 0–0.2)
BASOPHILS NFR BLD AUTO: 0.6 %
EOSINOPHIL # BLD AUTO: 0.16 X10(3) UL (ref 0–0.7)
EOSINOPHIL NFR BLD AUTO: 1.8 %
ERYTHROCYTE [DISTWIDTH] IN BLOOD BY AUTOMATED COUNT: 14.5 %
HBV SURFACE AG SER-ACNC: <0.1 [IU]/L
HBV SURFACE AG SERPL QL IA: NONREACTIVE
HCT VFR BLD AUTO: 40.2 %
HCV AB SERPL QL IA: NONREACTIVE
HGB BLD-MCNC: 12.9 G/DL
IMM GRANULOCYTES # BLD AUTO: 0.04 X10(3) UL (ref 0–1)
IMM GRANULOCYTES NFR BLD: 0.4 %
LYMPHOCYTES # BLD AUTO: 1.94 X10(3) UL (ref 1–4)
LYMPHOCYTES NFR BLD AUTO: 21.6 %
MCH RBC QN AUTO: 28.7 PG (ref 26–34)
MCHC RBC AUTO-ENTMCNC: 32.1 G/DL (ref 31–37)
MCV RBC AUTO: 89.5 FL
MONOCYTES # BLD AUTO: 0.44 X10(3) UL (ref 0.1–1)
MONOCYTES NFR BLD AUTO: 4.9 %
NEUTROPHILS # BLD AUTO: 6.36 X10 (3) UL (ref 1.5–7.7)
NEUTROPHILS # BLD AUTO: 6.36 X10(3) UL (ref 1.5–7.7)
NEUTROPHILS NFR BLD AUTO: 70.7 %
PLATELET # BLD AUTO: 255 10(3)UL (ref 150–450)
RBC # BLD AUTO: 4.49 X10(6)UL
RH BLOOD TYPE: NEGATIVE
T PALLIDUM AB SER QL IA: NONREACTIVE
WBC # BLD AUTO: 9 X10(3) UL (ref 4–11)

## 2024-07-10 PROCEDURE — 86803 HEPATITIS C AB TEST: CPT

## 2024-07-10 PROCEDURE — 86780 TREPONEMA PALLIDUM: CPT

## 2024-07-10 PROCEDURE — 86901 BLOOD TYPING SEROLOGIC RH(D): CPT

## 2024-07-10 PROCEDURE — 86850 RBC ANTIBODY SCREEN: CPT

## 2024-07-10 PROCEDURE — 86900 BLOOD TYPING SEROLOGIC ABO: CPT

## 2024-07-10 PROCEDURE — 85025 COMPLETE CBC W/AUTO DIFF WBC: CPT

## 2024-07-10 PROCEDURE — 36415 COLL VENOUS BLD VENIPUNCTURE: CPT

## 2024-07-10 PROCEDURE — 87340 HEPATITIS B SURFACE AG IA: CPT

## 2024-07-10 PROCEDURE — 87389 HIV-1 AG W/HIV-1&-2 AB AG IA: CPT

## 2024-07-10 PROCEDURE — 86762 RUBELLA ANTIBODY: CPT

## 2024-07-11 LAB
RUBV IGG SER QL: POSITIVE
RUBV IGG SER-ACNC: 33.5 IU/ML (ref 10–?)

## 2024-08-01 ENCOUNTER — ROUTINE PRENATAL (OUTPATIENT)
Dept: OBGYN CLINIC | Facility: CLINIC | Age: 34
End: 2024-08-01
Payer: COMMERCIAL

## 2024-08-01 ENCOUNTER — LAB ENCOUNTER (OUTPATIENT)
Dept: LAB | Age: 34
End: 2024-08-01
Attending: NURSE PRACTITIONER
Payer: COMMERCIAL

## 2024-08-01 VITALS
HEART RATE: 77 BPM | HEIGHT: 62 IN | DIASTOLIC BLOOD PRESSURE: 68 MMHG | BODY MASS INDEX: 31.1 KG/M2 | SYSTOLIC BLOOD PRESSURE: 108 MMHG | WEIGHT: 169 LBS

## 2024-08-01 DIAGNOSIS — Z36.8A ENCOUNTER FOR ANTENATAL SCREENING FOR OTHER GENETIC DEFECT (HCC): Primary | ICD-10-CM

## 2024-08-01 DIAGNOSIS — Z36.8A ENCOUNTER FOR ANTENATAL SCREENING FOR OTHER GENETIC DEFECT (HCC): ICD-10-CM

## 2024-08-01 PROCEDURE — 3074F SYST BP LT 130 MM HG: CPT | Performed by: NURSE PRACTITIONER

## 2024-08-01 PROCEDURE — 36415 COLL VENOUS BLD VENIPUNCTURE: CPT

## 2024-08-01 PROCEDURE — 3008F BODY MASS INDEX DOCD: CPT | Performed by: NURSE PRACTITIONER

## 2024-08-01 PROCEDURE — 3078F DIAST BP <80 MM HG: CPT | Performed by: NURSE PRACTITIONER

## 2024-08-01 PROCEDURE — 81511 FTL CGEN ABNOR FOUR ANAL: CPT

## 2024-08-01 NOTE — PROGRESS NOTES
GILMAR--16w5d    Doing well. Denies Vb, LOF, contractions. Not sure if she is feeling fetal movement.         A/P:   FHT-P  PNL: Pt desires Quad screen  RH negative: rhogam 28 week      Return in 4 weeks

## 2024-08-04 LAB
AFP MOM: 0.43
AFP VALUE: 14.8 NG/ML
DIA MOM: 1.27
DIA VALUE: 181.22 PG/ML
DSR 2ND TRI 1 IN: 326
DSR BY AGE  1 IN: 436
GA ON COLL DATE: 16.7 WEEKS
HCG MOM: 0.95
HCG VALUE: NORMAL MIU/ML
INSULIN DEP AFP: NO
MAT AGE AT EDD: 33.1 YR
MULTIPLE GEST AFP: NO
OSBR RISK 1 IN AFP: NORMAL
UE3 MOM: 0.74
UE3 VALUE: 0.8 NG/ML
WEIGHT AFP: 169 LBS

## 2024-08-30 ENCOUNTER — ULTRASOUND ENCOUNTER (OUTPATIENT)
Dept: OBGYN CLINIC | Facility: CLINIC | Age: 34
End: 2024-08-30
Payer: COMMERCIAL

## 2024-08-30 ENCOUNTER — ROUTINE PRENATAL (OUTPATIENT)
Dept: OBGYN CLINIC | Facility: CLINIC | Age: 34
End: 2024-08-30
Payer: COMMERCIAL

## 2024-08-30 VITALS
SYSTOLIC BLOOD PRESSURE: 114 MMHG | HEIGHT: 62 IN | WEIGHT: 173.25 LBS | BODY MASS INDEX: 31.88 KG/M2 | DIASTOLIC BLOOD PRESSURE: 62 MMHG | HEART RATE: 80 BPM

## 2024-08-30 DIAGNOSIS — Z36.89 SCREENING, ANTENATAL, FOR FETAL ANATOMIC SURVEY (HCC): ICD-10-CM

## 2024-08-30 DIAGNOSIS — Z36.9 ANTENATAL SCREENING ENCOUNTER (HCC): ICD-10-CM

## 2024-08-30 DIAGNOSIS — Z67.91 RH NEGATIVE STATE IN ANTEPARTUM PERIOD (HCC): ICD-10-CM

## 2024-08-30 DIAGNOSIS — Z34.90 PRENATAL CARE, ANTEPARTUM (HCC): Primary | ICD-10-CM

## 2024-08-30 DIAGNOSIS — Z36.89 ENCOUNTER FOR FETAL ANATOMIC SURVEY (HCC): ICD-10-CM

## 2024-08-30 DIAGNOSIS — Z36.9 ENCOUNTER FOR ANTENATAL SCREENING OF MOTHER (HCC): ICD-10-CM

## 2024-08-30 DIAGNOSIS — O26.899 RH NEGATIVE STATE IN ANTEPARTUM PERIOD (HCC): ICD-10-CM

## 2024-08-30 PROCEDURE — 3074F SYST BP LT 130 MM HG: CPT | Performed by: OBSTETRICS & GYNECOLOGY

## 2024-08-30 PROCEDURE — 3008F BODY MASS INDEX DOCD: CPT | Performed by: OBSTETRICS & GYNECOLOGY

## 2024-08-30 PROCEDURE — 3078F DIAST BP <80 MM HG: CPT | Performed by: OBSTETRICS & GYNECOLOGY

## 2024-08-30 PROCEDURE — 76805 OB US >/= 14 WKS SNGL FETUS: CPT | Performed by: OBSTETRICS & GYNECOLOGY

## 2024-08-30 NOTE — PROGRESS NOTES
Chief Complaint   Patient presents with    Prenatal Care     Ector     Routine prenatal visit. Patient without complaints. Good fetal movement.  Patient denies any bleeding, leaking fluid, cramping, or contractions.    Assessment/Plan:  20w6d doing well  Ab screen, 1 hr GTT and CBC next.  Blood type A neg  Screening for fetal malformations- Structure survey done today, normal.  Patient had tetra.  Declines carrier and Level 2.    Diagnoses and all orders for this visit:    Prenatal care, antepartum ([facility])     screening encounter ([facility])    Screening, , for fetal anatomic survey ([facility])    Encounter for fetal anatomic survey ([facility])  -     US OB COMPLETE 2ND TRIMESTER >14 WKS [facility] ONLY 70803; Future    Encounter for  screening of mother ([facility])  -     CBC (with DIFF, Platelet) Reflex to Ferritin; Future  -     Glucose 1 HR OB; Future  -     ANTIBODY SCREEN; Future    Rh negative state in antepartum period ([facility])  -     ANTIBODY SCREEN; Future      Return in about 4 weeks (around 2024) for Routine Prenatal Visit, Glucola and labs.

## 2024-09-27 ENCOUNTER — ROUTINE PRENATAL (OUTPATIENT)
Dept: OBGYN CLINIC | Facility: CLINIC | Age: 34
End: 2024-09-27
Payer: COMMERCIAL

## 2024-09-27 VITALS
DIASTOLIC BLOOD PRESSURE: 60 MMHG | WEIGHT: 180 LBS | SYSTOLIC BLOOD PRESSURE: 102 MMHG | BODY MASS INDEX: 33.13 KG/M2 | HEIGHT: 62 IN | HEART RATE: 82 BPM

## 2024-09-27 DIAGNOSIS — Z36.9 ANTENATAL SCREENING ENCOUNTER (HCC): ICD-10-CM

## 2024-09-27 DIAGNOSIS — Z3A.24 24 WEEKS GESTATION OF PREGNANCY (HCC): Primary | ICD-10-CM

## 2024-09-27 PROCEDURE — 3078F DIAST BP <80 MM HG: CPT | Performed by: NURSE PRACTITIONER

## 2024-09-27 PROCEDURE — 3074F SYST BP LT 130 MM HG: CPT | Performed by: NURSE PRACTITIONER

## 2024-09-27 PROCEDURE — 3008F BODY MASS INDEX DOCD: CPT | Performed by: NURSE PRACTITIONER

## 2024-09-27 RX ORDER — FAMOTIDINE 20 MG/1
TABLET, FILM COATED ORAL
COMMUNITY
Start: 2024-09-12

## 2024-09-27 NOTE — PROGRESS NOTES
GILMAR--24w6d    Doing well. Denies Vb, LOF, contractions. + fetal movement.         A/P:   FHT-P  PNL: Pt requests HIV and T pallidum. Will complete with 2T labs 27-28 weeks  RH negative  Reviewed  labor precautions  Fetal movement discussed      Return in 4 weeks

## 2024-10-14 ENCOUNTER — LAB ENCOUNTER (OUTPATIENT)
Dept: LAB | Age: 34
End: 2024-10-14
Attending: OBSTETRICS & GYNECOLOGY
Payer: COMMERCIAL

## 2024-10-14 DIAGNOSIS — Z36.9 ENCOUNTER FOR ANTENATAL SCREENING OF MOTHER (HCC): ICD-10-CM

## 2024-10-14 DIAGNOSIS — Z67.91 RH NEGATIVE STATE IN ANTEPARTUM PERIOD (HCC): ICD-10-CM

## 2024-10-14 DIAGNOSIS — Z36.9 ANTENATAL SCREENING ENCOUNTER (HCC): ICD-10-CM

## 2024-10-14 DIAGNOSIS — O26.899 RH NEGATIVE STATE IN ANTEPARTUM PERIOD (HCC): ICD-10-CM

## 2024-10-14 LAB
ANTIBODY SCREEN: NEGATIVE
BASOPHILS # BLD AUTO: 0.07 X10(3) UL (ref 0–0.2)
BASOPHILS NFR BLD AUTO: 0.7 %
EOSINOPHIL # BLD AUTO: 0.22 X10(3) UL (ref 0–0.7)
EOSINOPHIL NFR BLD AUTO: 2.3 %
ERYTHROCYTE [DISTWIDTH] IN BLOOD BY AUTOMATED COUNT: 14.1 %
GLUCOSE 1H P GLC SERPL-MCNC: 92 MG/DL
HCT VFR BLD AUTO: 37.1 %
HGB BLD-MCNC: 12.1 G/DL
IMM GRANULOCYTES # BLD AUTO: 0.15 X10(3) UL (ref 0–1)
IMM GRANULOCYTES NFR BLD: 1.6 %
LYMPHOCYTES # BLD AUTO: 1.5 X10(3) UL (ref 1–4)
LYMPHOCYTES NFR BLD AUTO: 15.6 %
MCH RBC QN AUTO: 29.1 PG (ref 26–34)
MCHC RBC AUTO-ENTMCNC: 32.6 G/DL (ref 31–37)
MCV RBC AUTO: 89.2 FL
MONOCYTES # BLD AUTO: 0.56 X10(3) UL (ref 0.1–1)
MONOCYTES NFR BLD AUTO: 5.8 %
NEUTROPHILS # BLD AUTO: 7.12 X10 (3) UL (ref 1.5–7.7)
NEUTROPHILS # BLD AUTO: 7.12 X10(3) UL (ref 1.5–7.7)
NEUTROPHILS NFR BLD AUTO: 74 %
PLATELET # BLD AUTO: 242 10(3)UL (ref 150–450)
RBC # BLD AUTO: 4.16 X10(6)UL
T PALLIDUM AB SER QL IA: NONREACTIVE
WBC # BLD AUTO: 9.6 X10(3) UL (ref 4–11)

## 2024-10-14 PROCEDURE — 82950 GLUCOSE TEST: CPT

## 2024-10-14 PROCEDURE — 87389 HIV-1 AG W/HIV-1&-2 AB AG IA: CPT

## 2024-10-14 PROCEDURE — 86780 TREPONEMA PALLIDUM: CPT

## 2024-10-14 PROCEDURE — 86850 RBC ANTIBODY SCREEN: CPT

## 2024-10-14 PROCEDURE — 36415 COLL VENOUS BLD VENIPUNCTURE: CPT

## 2024-10-14 PROCEDURE — 85025 COMPLETE CBC W/AUTO DIFF WBC: CPT

## 2024-10-25 ENCOUNTER — ROUTINE PRENATAL (OUTPATIENT)
Dept: OBGYN CLINIC | Facility: CLINIC | Age: 34
End: 2024-10-25
Payer: COMMERCIAL

## 2024-10-25 ENCOUNTER — TELEPHONE (OUTPATIENT)
Dept: OBGYN CLINIC | Facility: CLINIC | Age: 34
End: 2024-10-25

## 2024-10-25 VITALS
HEIGHT: 62 IN | DIASTOLIC BLOOD PRESSURE: 64 MMHG | BODY MASS INDEX: 33.68 KG/M2 | HEART RATE: 81 BPM | WEIGHT: 183 LBS | SYSTOLIC BLOOD PRESSURE: 112 MMHG

## 2024-10-25 DIAGNOSIS — Z3A.28 28 WEEKS GESTATION OF PREGNANCY (HCC): Primary | ICD-10-CM

## 2024-10-25 DIAGNOSIS — Z23 NEED FOR VACCINATION: ICD-10-CM

## 2024-10-25 PROCEDURE — 90715 TDAP VACCINE 7 YRS/> IM: CPT | Performed by: STUDENT IN AN ORGANIZED HEALTH CARE EDUCATION/TRAINING PROGRAM

## 2024-10-25 PROCEDURE — 90471 IMMUNIZATION ADMIN: CPT | Performed by: STUDENT IN AN ORGANIZED HEALTH CARE EDUCATION/TRAINING PROGRAM

## 2024-10-25 PROCEDURE — 90656 IIV3 VACC NO PRSV 0.5 ML IM: CPT | Performed by: STUDENT IN AN ORGANIZED HEALTH CARE EDUCATION/TRAINING PROGRAM

## 2024-10-25 PROCEDURE — 96372 THER/PROPH/DIAG INJ SC/IM: CPT | Performed by: STUDENT IN AN ORGANIZED HEALTH CARE EDUCATION/TRAINING PROGRAM

## 2024-10-25 PROCEDURE — 3008F BODY MASS INDEX DOCD: CPT | Performed by: STUDENT IN AN ORGANIZED HEALTH CARE EDUCATION/TRAINING PROGRAM

## 2024-10-25 PROCEDURE — 90472 IMMUNIZATION ADMIN EACH ADD: CPT | Performed by: STUDENT IN AN ORGANIZED HEALTH CARE EDUCATION/TRAINING PROGRAM

## 2024-10-25 PROCEDURE — 3074F SYST BP LT 130 MM HG: CPT | Performed by: STUDENT IN AN ORGANIZED HEALTH CARE EDUCATION/TRAINING PROGRAM

## 2024-10-25 PROCEDURE — 3078F DIAST BP <80 MM HG: CPT | Performed by: STUDENT IN AN ORGANIZED HEALTH CARE EDUCATION/TRAINING PROGRAM

## 2024-10-25 NOTE — PATIENT INSTRUCTIONS
KICK COUNT INSTRUCTIONS    After 28 weeks of pregnancy, we would like for you to monitor your baby’s movement daily by doing kick counts, an easy way for you to assess your baby’s well-being.    How to count kicks:  Choose a time when the baby is active, such as after a meal.  Sit comfortably or lie on your side, and count the number of movements in an hour… you should feel 10 movements in 2 hours    The evening hours seem to be the most convenient time to do this test, although you can also do this test anytime you are concerned about the baby’s movement.    Call the office right away at [phone] if you notice any of the following:  Your baby moves less than 10 times in 2 hours while you are doing kick counts.  Your baby moves much less often than on the days before.  You have not felt your baby move all day.

## 2024-10-25 NOTE — TELEPHONE ENCOUNTER
----- Message from Puma Roberts sent at 10/25/2024  9:28 AM CDT -----  Request IOL at/between this GA: 39 wks  Estimated Date of Delivery: 1/11/25  Indication for IOL:  hx of large baby   Time of appointment: PM  Cervical ripening with cytotec: yes  IOL with pitocin: yes, per protocol   OB history/complications: none

## 2024-11-08 ENCOUNTER — TELEPHONE (OUTPATIENT)
Dept: OBGYN CLINIC | Facility: CLINIC | Age: 34
End: 2024-11-08

## 2024-11-08 ENCOUNTER — ROUTINE PRENATAL (OUTPATIENT)
Dept: OBGYN CLINIC | Facility: CLINIC | Age: 34
End: 2024-11-08
Payer: COMMERCIAL

## 2024-11-08 VITALS
HEIGHT: 62 IN | SYSTOLIC BLOOD PRESSURE: 112 MMHG | HEART RATE: 81 BPM | BODY MASS INDEX: 34.19 KG/M2 | DIASTOLIC BLOOD PRESSURE: 68 MMHG | WEIGHT: 185.81 LBS

## 2024-11-08 DIAGNOSIS — Z87.59 HISTORY OF GESTATIONAL HYPERTENSION: ICD-10-CM

## 2024-11-08 DIAGNOSIS — Z34.90 PRENATAL CARE, ANTEPARTUM (HCC): Primary | ICD-10-CM

## 2024-11-08 PROCEDURE — 3074F SYST BP LT 130 MM HG: CPT | Performed by: OBSTETRICS & GYNECOLOGY

## 2024-11-08 PROCEDURE — 3008F BODY MASS INDEX DOCD: CPT | Performed by: OBSTETRICS & GYNECOLOGY

## 2024-11-08 PROCEDURE — 3078F DIAST BP <80 MM HG: CPT | Performed by: OBSTETRICS & GYNECOLOGY

## 2024-11-08 NOTE — PROGRESS NOTES
[facility]  Obstetrics and Gynecology  Routine OB Visit Progress Note    Subjective:     Amanda Noel Brauweiler is a 34 year old  at 30w6d who presents for routine OB visit.  Patient reports doing well.  Denies contractions, vaginal bleeding or leakage of fluid.  Good fetal movement.    1 hour GTT, CBC done.  Rh -, rhogam complete, TDAP received at last visit  EPDS Depression Scale Total: 0 (2024  7:51 AM)   HIV and RPR done    Objective:   /68   Pulse 81   Ht 62\"   Wt 185 lb 12.8 oz (84.3 kg)   LMP 2024 (Approximate)   BMI 33.98 kg/m²   ABD: gravid, nontender  FHT: 150   Fundal Height: 29   Assessment:     Amanda Noel Brauweiler is a 34 year old  at 30w6d who presents for routine OB visit. Overall doing well.     Problem List Items Addressed This Visit    None  Visit Diagnoses       Prenatal care, antepartum (HCC)    -  Primary            Plan:     Patient Active Problem List    Diagnosis    Rh negative state in antepartum period (HCC)     Rhogam given early 2024  [ X ] Rhogam 28 wks      History of gestational hypertension     IOL at 38 weeks due to GHTN, IOL, episiotomy, failed vacuum  [  ] BASA       History of one miscarriage     Anxious about recurrent miscarriage- offered early dating ultrasound         - continue routine prenatal care   - labor and rupture of membrane precautions provided  - Fetal movement instructions given    Return to clinic in 2 weeks for GILMAR visit     Albina Dixon MD   [facility] - OBGYN    Note to patient and family   The 21st Century Cures Act makes medical notes available to patients in the interest of transparency.  However, please be advised that this is a medical document.  It is intended as oqht-lv-xydg communication.  It is written and medical language may contain abbreviations or verbiage that are technical and unfamiliar.  It may appear blunt or direct.  Medical documents are intended to carry relevant information, facts as evident,  and the clinical opinion of the practitioner.

## 2024-11-22 ENCOUNTER — ROUTINE PRENATAL (OUTPATIENT)
Dept: OBGYN CLINIC | Facility: CLINIC | Age: 34
End: 2024-11-22
Payer: COMMERCIAL

## 2024-11-22 VITALS
DIASTOLIC BLOOD PRESSURE: 70 MMHG | WEIGHT: 186 LBS | HEART RATE: 116 BPM | SYSTOLIC BLOOD PRESSURE: 114 MMHG | BODY MASS INDEX: 34 KG/M2

## 2024-11-22 DIAGNOSIS — Z3A.32 32 WEEKS GESTATION OF PREGNANCY (HCC): Primary | ICD-10-CM

## 2024-11-22 PROCEDURE — 3078F DIAST BP <80 MM HG: CPT | Performed by: STUDENT IN AN ORGANIZED HEALTH CARE EDUCATION/TRAINING PROGRAM

## 2024-11-22 PROCEDURE — 90678 RSV VACC PREF BIVALENT IM: CPT | Performed by: STUDENT IN AN ORGANIZED HEALTH CARE EDUCATION/TRAINING PROGRAM

## 2024-11-22 PROCEDURE — 3074F SYST BP LT 130 MM HG: CPT | Performed by: STUDENT IN AN ORGANIZED HEALTH CARE EDUCATION/TRAINING PROGRAM

## 2024-11-22 PROCEDURE — 90471 IMMUNIZATION ADMIN: CPT | Performed by: STUDENT IN AN ORGANIZED HEALTH CARE EDUCATION/TRAINING PROGRAM

## 2024-11-22 NOTE — PATIENT INSTRUCTIONS
KICK COUNT INSTRUCTIONS    After 28 weeks of pregnancy, we would like for you to monitor your baby’s movement daily by doing kick counts, an easy way for you to assess your baby’s well-being.    How to count kicks:  Choose a time when the baby is active, such as after a meal.  Sit comfortably or lie on your side, and count the number of movements in an hour… you should feel 10 movements in 2 hours    The evening hours seem to be the most convenient time to do this test, although you can also do this test anytime you are concerned about the baby’s movement.    Call the office right away at [phone] if you notice any of the following:  Your baby moves less than 10 times in 2 hours while you are doing kick counts.  Your baby moves much less often than on the days before.  You have not felt your baby move all day.

## 2024-11-22 NOTE — PROGRESS NOTES
Return OB Visit 28-33 WGA      GA: 32w6d  Vitals:    24 0817   BP: 114/70   Pulse: 116   Weight: 186 lb (84.4 kg)       Doing well, +FM  Denies LOF/VB/uctx  Rh negative s/p rhogam, TDAP received 10/25/24, EPDS Depression Scale Total: 0 (2024  7:51 AM)   PTL and Fetal movement instructions given  3rd T HIV/RPR NR      Patient Active Problem List    Diagnosis    Rh negative state in antepartum period (HCC)     Rhogam given early 2024  [ X ] Rhogam 28 wks      History of gestational hypertension     IOL at 38 weeks due to GHTN, IOL, episiotomy, failed vacuum  [ x ] MARJORIE has been taking      History of one miscarriage     Anxious about recurrent miscarriage- offered early dating ultrasound           RTC in 2 wks      Note to patient and family   The  Century Cures Act makes medical notes available to patients in the interest of transparency.  However, please be advised that this is a medical document.  It is intended as rako-wu-boah communication.  It is written and medical language may contain abbreviations or verbiage that are technical and unfamiliar.  It may appear blunt or direct.  Medical documents are intended to carry relevant information, facts as evident, and the clinical opinion of the practitioner.      Puma Roberts MD

## 2024-12-06 ENCOUNTER — ROUTINE PRENATAL (OUTPATIENT)
Dept: OBGYN CLINIC | Facility: CLINIC | Age: 34
End: 2024-12-06
Payer: COMMERCIAL

## 2024-12-06 VITALS
WEIGHT: 184.38 LBS | BODY MASS INDEX: 34 KG/M2 | HEART RATE: 93 BPM | DIASTOLIC BLOOD PRESSURE: 66 MMHG | SYSTOLIC BLOOD PRESSURE: 116 MMHG

## 2024-12-06 DIAGNOSIS — Z87.59 HISTORY OF GESTATIONAL HYPERTENSION: ICD-10-CM

## 2024-12-06 DIAGNOSIS — O26.899 RH NEGATIVE STATE IN ANTEPARTUM PERIOD (HCC): ICD-10-CM

## 2024-12-06 DIAGNOSIS — Z67.91 RH NEGATIVE STATE IN ANTEPARTUM PERIOD (HCC): ICD-10-CM

## 2024-12-06 DIAGNOSIS — Z3A.34 34 WEEKS GESTATION OF PREGNANCY (HCC): Primary | ICD-10-CM

## 2024-12-06 PROCEDURE — 3074F SYST BP LT 130 MM HG: CPT | Performed by: NURSE PRACTITIONER

## 2024-12-06 PROCEDURE — 3078F DIAST BP <80 MM HG: CPT | Performed by: NURSE PRACTITIONER

## 2024-12-06 NOTE — PROGRESS NOTES
GILMAR 34w6d    Doing well, +FM  Denies contractions  Denies LOF, VB      FHT-P  PNL:  Negative HIV and T pallium  Mode of delivery: anticipate    Immunizations: s/p TDAP, flu and RSV  RH negative: s/p rhogam   labor precautions reviewed  Fetal movement discussed    Return in 2 weeks

## 2024-12-12 ENCOUNTER — TELEPHONE (OUTPATIENT)
Dept: OBGYN CLINIC | Facility: CLINIC | Age: 34
End: 2024-12-12

## 2024-12-12 NOTE — TELEPHONE ENCOUNTER
Rec'd fax from [facility]. Emailed to forms dept. and sent hardcopy to forms dept. Via inter office mail

## 2024-12-15 ENCOUNTER — PATIENT MESSAGE (OUTPATIENT)
Dept: FAMILY MEDICINE CLINIC | Facility: CLINIC | Age: 34
End: 2024-12-15

## 2024-12-15 DIAGNOSIS — Z12.83 SCREENING EXAM FOR SKIN CANCER: Primary | ICD-10-CM

## 2024-12-20 ENCOUNTER — ROUTINE PRENATAL (OUTPATIENT)
Dept: OBGYN CLINIC | Facility: CLINIC | Age: 34
End: 2024-12-20
Payer: COMMERCIAL

## 2024-12-20 ENCOUNTER — TELEPHONE (OUTPATIENT)
Dept: OBGYN UNIT | Facility: HOSPITAL | Age: 34
End: 2024-12-20

## 2024-12-20 VITALS
SYSTOLIC BLOOD PRESSURE: 112 MMHG | HEART RATE: 86 BPM | WEIGHT: 192.81 LBS | DIASTOLIC BLOOD PRESSURE: 64 MMHG | BODY MASS INDEX: 35 KG/M2

## 2024-12-20 DIAGNOSIS — O26.899 RH NEGATIVE STATE IN ANTEPARTUM PERIOD (HCC): ICD-10-CM

## 2024-12-20 DIAGNOSIS — Z87.59 HISTORY OF ONE MISCARRIAGE: ICD-10-CM

## 2024-12-20 DIAGNOSIS — Z34.90 ENCOUNTER FOR SUPERVISION OF NORMAL PREGNANCY, ANTEPARTUM, UNSPECIFIED GRAVIDITY (HCC): Primary | ICD-10-CM

## 2024-12-20 DIAGNOSIS — Z87.59 HISTORY OF GESTATIONAL HYPERTENSION: ICD-10-CM

## 2024-12-20 DIAGNOSIS — Z67.91 RH NEGATIVE STATE IN ANTEPARTUM PERIOD (HCC): ICD-10-CM

## 2024-12-20 PROCEDURE — 87081 CULTURE SCREEN ONLY: CPT | Performed by: STUDENT IN AN ORGANIZED HEALTH CARE EDUCATION/TRAINING PROGRAM

## 2024-12-20 PROCEDURE — 3078F DIAST BP <80 MM HG: CPT | Performed by: STUDENT IN AN ORGANIZED HEALTH CARE EDUCATION/TRAINING PROGRAM

## 2024-12-20 PROCEDURE — 87150 DNA/RNA AMPLIFIED PROBE: CPT | Performed by: STUDENT IN AN ORGANIZED HEALTH CARE EDUCATION/TRAINING PROGRAM

## 2024-12-20 PROCEDURE — 3074F SYST BP LT 130 MM HG: CPT | Performed by: STUDENT IN AN ORGANIZED HEALTH CARE EDUCATION/TRAINING PROGRAM

## 2024-12-20 NOTE — TELEPHONE ENCOUNTER
Ellen,    Please sign off on form if you agree to:Family Medical Leave Act/Maternity leave LOS 1/11/25    -Signature page will be the first page scanned  -From your Inbasket, Highlight the patient and click Chart   -Double click the 12/12/24 Forms Completion telephone encounter  -Scroll down to the Media section   -Click the blue Hyperlink: Ellen FERGUSON, 12/20/24    -Click Acknowledge located in the top right ribbon/menu   -Drag the mouse into the blank space of the document and a + sign will appear. Left click to   electronically sign the document.  -Once signed, simply exit out of the screen and you signature will be saved.     Thank you,  Shyla GALLAGHER

## 2024-12-20 NOTE — PROGRESS NOTES
GILMAR    GA: 36w6d  Vitals:    24 0759   BP: 112/64   Pulse: 86   Weight: 192 lb 12.8 oz (87.5 kg)       Doing well, +FM  Denies LOF/VB/uctx  SVE C/50/-3, soft and posterior    Assessment:     Amanda Noel Brauweiler is a 34 year old  at 36w6d who presents for routine OB visit. Overall doing well.     Problem List Items Addressed This Visit       History of one miscarriage    History of gestational hypertension    Rh negative state in antepartum period ([facility])     Other Visit Diagnoses       Encounter for supervision of normal pregnancy, antepartum, unspecified  ([facility])    -  Primary    Relevant Orders    Group B Strep PCR                Plan:     Patient Active Problem List    Diagnosis    Rh negative state in antepartum period ([facility])     Rhogam given early 2024  [ X ] Rhogam 28 wks      History of gestational hypertension     IOL at 38 weeks due to GHTN, IOL, episiotomy, failed vacuum  [ x ] MARJORIE has been taking      History of one miscarriage     Anxious about recurrent miscarriage- offered early dating ultrasound         - continue routine prenatal care   - labor and rupture of membrane precautions provided  GBS collected today  Lab Results   Component Value Date    GBS No Beta Hemolytic Strep Group B Isolated. 2022    GBS Negative 2022      Fetal movement count given  Labor precautions provided   IOL scheduled 2025 PM  Return to clinic in 1 week for GILMAR visit         Alise Abdul,   [facility] - OBGYN      Note to patient and family   The  Century Cures Act makes medical notes available to patients in the interest of transparency.  However, please be advised that this is a medical document.  It is intended as trpd-ms-myno communication.  It is written and medical language may contain abbreviations or verbiage that are technical and unfamiliar.  It may appear blunt or direct.  Medical documents are intended to carry relevant information, facts as evident, and the  clinical opinion of the practitioner.

## 2024-12-20 NOTE — TELEPHONE ENCOUNTER
Family Medical Leave Act forms rec'd via email. Sent [employer] message for Release of Information. Logged for processing.

## 2024-12-20 NOTE — PATIENT INSTRUCTIONS
Labor Instructions    How do I know if it’s true labor?  One of the most important aspects of any pregnancy is being able to recognize the onset of labor.  Unfortunately, on occasion it can be difficult or confusing, especially if you have had one or more children.  Each labor can begin in a different way even if you have had four or five children.    If this is your first child, it is very common to have labor for an average greater than 10 hours; however, there have been rare instances for labor to be two hours or less for a first time mother. It is more important for you to know if this is your second or third baby to realize that any labor after the first is usually shorter.  There is no way to tell how long or short the labor will be. Therefore, please call us if you are unsure labor has started.       Usually, during the last six weeks of pregnancy, Jose L-Guy contractions or “false labor pains occur”.  False labor is generally not very painful though it is not always easy to tell.  You may feel contractions, cramps or uterine tightening somewhere between every 3-30 minutes but they will not continue to get stronger over time.  If you lie down, drink plenty of fluid or walk around, the contractions may go away.   False contractions are very common if you have been active on your feet for several hours.   Women frequently worry about being sent home from the hospital without having their baby (i.e. the labor stopped).  Actually, this is an unnecessary worry, for this is an infrequent occurrence.     True labor usually begins in one of two ways.  In most patients it begins with contractions of the uterus, which are irregular (but not always) in the beginning.  They are cramp-like in character and feel similar to menstrual cramps.  After a while, they become more regular, and they seem to last a little longer, and feel a little sharper.  These symptoms are very important-more important- than the timing of the  contractions.  Having regular (usually closer), longer lasting (35-70 seconds), and sharper (more painful) contractions are the common symptoms of actual labor.  The second way in which labor can begin which occurs in approximately 30% of all patients is the rupture of the bag of water.  This is a sudden gush of watery fluid, usually sufficient to run down your leg and onto the floor, or you may wet a large area of the bed if it happens at night.  There may also be tricking that is uncontrolled.  If you are unsure, please call the office.      When should I call?  When contractions are strong and every 3-5 minutes.  If you have a gush of water or you think you might be leaking fluid.  If you are bleeding heavily.  If your baby is not moving around at least every 1-2 hours.  If you are worried about something.  When you think you are ready to go to the hospital.    Who do I call?  Call the office at [phone].  If the office is closed, the answering service will send a message to the physician on call.  The on call physician will be available for emergency phone calls only.          Can I eat in labor?  It is good to eat a light meal at home before going to the hospital.  Eat foods like crackers, popsicles, soup and fruit.  Avoid foods that are difficult to digest like meat, a lot of dairy products and high fat foods.  DO NOT EAT if you know you are scheduled for a  ().      What will happen when I get to the hospital?  When you arrive at the hospital, you will be admitted and examined.   There are a few factors that will determine if you will be allowed to be up out of bed or if you would need to stay in bed.  The main factors are how well the baby is entering into the pelvis and if the bag of walker is in intact or ruptured.  An intravenous (IV) solution will, with exceptions, be started.  This is extremely important especially for the baby.   Your  will be allowed in the room during your  labor. During the delivery, the nurses will inform you of the hospital policy and how many coaches are allowed.  You may desire pain medication or anesthesia for pain.  You probably discussed some aspects of pain medication with us during your prenatal care.  The various options may also have been discussed in Prenatal Classes.  We utilize IV narcotics and epidural anesthesia when our patients request to have them.  If you chose to have no anesthesia, none will be administered.  A local anesthetic may be used at the time of delivery      What should I to bring to the hospital?  Maternity clothes to go home in  You can bring your own night gown to wear after giving birth, but most women prefer to wear the hospital gown because it may get soiled  Nursing Bra if you are planning to breastfeed  Clothes for your baby to go home in  Baby Car Seat.  Be sure you know how to install it correctly. Please install it before going to the hospital  Routine toiletries like toothbrush, shampoo, hairbrush and etc.   You can bring your favorite pillow, but please put a colored pillow case on it so it doesn’t get mixed up with hospital pillows    How long will I stay in the hospital?  The date you leave the hospital may vary depending on the speed of your recovery.  If you have a vaginal delivery, you will stay in the hospital 24-48 hours after your delivery as long as you aren’t having any complications.    If you have had a , you will stay in the hospital 48-72 hours as long as you aren’t having any complications.       Going Home Instructions  There are no set rules as to what you may do each day or week after you are home.  You will receive discharge instructions to help you each day.  Remember, early ambulation in the hospital is to prevent complications.  Do not let this lull you into a false sense of strength or ability to do certain physical acts which may tire you excessively.  Please call the office within a few days  after you are discharged from the hospital to schedule your post-partum visit, which is usually 4-6 weeks after delivery.    Any medications necessary will be discussed on an individual basis.  If you decide to breastfeed your baby, you should continue your prenatal vitamins.  If you do not breastfeed, simply finish the prenatal vitamins you have.      The staff at [facility] wish you a joyous and exciting birth.  If there is anything we can do to make this a better experience for you please do not hesitate to ask.

## 2024-12-21 LAB — GROUP B STREP BY PCR FOR PCR OVT: NEGATIVE

## 2024-12-27 ENCOUNTER — ROUTINE PRENATAL (OUTPATIENT)
Dept: OBGYN CLINIC | Facility: CLINIC | Age: 34
End: 2024-12-27
Payer: COMMERCIAL

## 2024-12-27 VITALS
SYSTOLIC BLOOD PRESSURE: 114 MMHG | HEART RATE: 107 BPM | WEIGHT: 196.19 LBS | DIASTOLIC BLOOD PRESSURE: 62 MMHG | BODY MASS INDEX: 36 KG/M2

## 2024-12-27 DIAGNOSIS — Z3A.37 37 WEEKS GESTATION OF PREGNANCY (HCC): Primary | ICD-10-CM

## 2024-12-27 PROCEDURE — 3074F SYST BP LT 130 MM HG: CPT | Performed by: NURSE PRACTITIONER

## 2024-12-27 PROCEDURE — 3078F DIAST BP <80 MM HG: CPT | Performed by: NURSE PRACTITIONER

## 2024-12-27 NOTE — PROGRESS NOTES
GILMAR 37w6d    Doing well, +FM  Denies contractions  Denies LOF, VB      FHT-P  PNL:  GBS negative  Mode of delivery: anticipate , IOL scheduled 2024  Immunizations: s/p TDAP and RSV  Labor precautions reviewed  Fetal movement discussed    Return in 1 weeks

## 2025-01-03 ENCOUNTER — ANESTHESIA (OUTPATIENT)
Dept: OBGYN UNIT | Facility: HOSPITAL | Age: 35
End: 2025-01-03
Payer: COMMERCIAL

## 2025-01-03 ENCOUNTER — ANESTHESIA EVENT (OUTPATIENT)
Dept: OBGYN UNIT | Facility: HOSPITAL | Age: 35
End: 2025-01-03
Payer: COMMERCIAL

## 2025-01-03 ENCOUNTER — HOSPITAL ENCOUNTER (INPATIENT)
Facility: HOSPITAL | Age: 35
LOS: 2 days | Discharge: HOME | End: 2025-01-05
Attending: OBSTETRICS & GYNECOLOGY | Admitting: OBSTETRICS & GYNECOLOGY
Payer: COMMERCIAL

## 2025-01-03 ENCOUNTER — ROUTINE PRENATAL (OUTPATIENT)
Dept: OBGYN CLINIC | Facility: CLINIC | Age: 35
End: 2025-01-03
Payer: COMMERCIAL

## 2025-01-03 VITALS
HEART RATE: 82 BPM | DIASTOLIC BLOOD PRESSURE: 64 MMHG | BODY MASS INDEX: 36.14 KG/M2 | SYSTOLIC BLOOD PRESSURE: 112 MMHG | HEIGHT: 62 IN | WEIGHT: 196.38 LBS

## 2025-01-03 DIAGNOSIS — O36.8190 DECREASED FETAL MOVEMENT AFFECTING MANAGEMENT OF PREGNANCY, ANTEPARTUM, SINGLE OR UNSPECIFIED FETUS (HCC): ICD-10-CM

## 2025-01-03 DIAGNOSIS — Z34.90 PRENATAL CARE, ANTEPARTUM (HCC): Primary | ICD-10-CM

## 2025-01-03 PROBLEM — Z87.59 HISTORY OF ONE MISCARRIAGE: Status: RESOLVED | Noted: 2021-10-05 | Resolved: 2025-01-03

## 2025-01-03 LAB
ANTIBODY SCREEN: POSITIVE
BASOPHILS # BLD AUTO: 0.07 X10(3) UL (ref 0–0.2)
BASOPHILS NFR BLD AUTO: 0.6 %
EOSINOPHIL # BLD AUTO: 0.09 X10(3) UL (ref 0–0.7)
EOSINOPHIL NFR BLD AUTO: 0.8 %
ERYTHROCYTE [DISTWIDTH] IN BLOOD BY AUTOMATED COUNT: 13.9 %
HCT VFR BLD AUTO: 42.7 %
HGB BLD-MCNC: 14.2 G/DL
IMM GRANULOCYTES # BLD AUTO: 0.13 X10(3) UL (ref 0–1)
IMM GRANULOCYTES NFR BLD: 1.1 %
LYMPHOCYTES # BLD AUTO: 2.09 X10(3) UL (ref 1–4)
LYMPHOCYTES NFR BLD AUTO: 18.4 %
MCH RBC QN AUTO: 28.8 PG (ref 26–34)
MCHC RBC AUTO-ENTMCNC: 33.3 G/DL (ref 31–37)
MCV RBC AUTO: 86.6 FL
MONOCYTES # BLD AUTO: 0.65 X10(3) UL (ref 0.1–1)
MONOCYTES NFR BLD AUTO: 5.7 %
NEUTROPHILS # BLD AUTO: 8.33 X10 (3) UL (ref 1.5–7.7)
NEUTROPHILS # BLD AUTO: 8.33 X10(3) UL (ref 1.5–7.7)
NEUTROPHILS NFR BLD AUTO: 73.4 %
PLATELET # BLD AUTO: 272 10(3)UL (ref 150–450)
RBC # BLD AUTO: 4.93 X10(6)UL
RH BLOOD TYPE: NEGATIVE
T PALLIDUM AB SER QL IA: NONREACTIVE
WBC # BLD AUTO: 11.4 X10(3) UL (ref 4–11)

## 2025-01-03 PROCEDURE — 3E033VJ INTRODUCTION OF OTHER HORMONE INTO PERIPHERAL VEIN, PERCUTANEOUS APPROACH: ICD-10-PCS | Performed by: OBSTETRICS & GYNECOLOGY

## 2025-01-03 PROCEDURE — 3008F BODY MASS INDEX DOCD: CPT | Performed by: OBSTETRICS & GYNECOLOGY

## 2025-01-03 PROCEDURE — 10H07YZ INSERTION OF OTHER DEVICE INTO PRODUCTS OF CONCEPTION, VIA NATURAL OR ARTIFICIAL OPENING: ICD-10-PCS | Performed by: OBSTETRICS & GYNECOLOGY

## 2025-01-03 PROCEDURE — 59025 FETAL NON-STRESS TEST: CPT | Performed by: OBSTETRICS & GYNECOLOGY

## 2025-01-03 PROCEDURE — 59400 OBSTETRICAL CARE: CPT | Performed by: OBSTETRICS & GYNECOLOGY

## 2025-01-03 PROCEDURE — 3078F DIAST BP <80 MM HG: CPT | Performed by: OBSTETRICS & GYNECOLOGY

## 2025-01-03 PROCEDURE — 10907ZC DRAINAGE OF AMNIOTIC FLUID, THERAPEUTIC FROM PRODUCTS OF CONCEPTION, VIA NATURAL OR ARTIFICIAL OPENING: ICD-10-PCS | Performed by: OBSTETRICS & GYNECOLOGY

## 2025-01-03 PROCEDURE — 3074F SYST BP LT 130 MM HG: CPT | Performed by: OBSTETRICS & GYNECOLOGY

## 2025-01-03 RX ORDER — IBUPROFEN 600 MG/1
600 TABLET, FILM COATED ORAL ONCE AS NEEDED
Status: DISCONTINUED | OUTPATIENT
Start: 2025-01-03 | End: 2025-01-04 | Stop reason: HOSPADM

## 2025-01-03 RX ORDER — ACETAMINOPHEN 500 MG
500 TABLET ORAL EVERY 6 HOURS PRN
Status: DISCONTINUED | OUTPATIENT
Start: 2025-01-03 | End: 2025-01-05

## 2025-01-03 RX ORDER — ACETAMINOPHEN 500 MG
500 TABLET ORAL EVERY 6 HOURS PRN
Status: DISCONTINUED | OUTPATIENT
Start: 2025-01-03 | End: 2025-01-03

## 2025-01-03 RX ORDER — AMMONIA 15 % (W/V)
0.3 AMPUL (EA) INHALATION AS NEEDED
Status: DISCONTINUED | OUTPATIENT
Start: 2025-01-03 | End: 2025-01-05

## 2025-01-03 RX ORDER — DOCUSATE SODIUM 100 MG/1
100 CAPSULE, LIQUID FILLED ORAL
Status: DISCONTINUED | OUTPATIENT
Start: 2025-01-04 | End: 2025-01-05

## 2025-01-03 RX ORDER — NALBUPHINE HYDROCHLORIDE 10 MG/ML
2.5 INJECTION INTRAMUSCULAR; INTRAVENOUS; SUBCUTANEOUS
Status: DISCONTINUED | OUTPATIENT
Start: 2025-01-03 | End: 2025-01-04

## 2025-01-03 RX ORDER — ACETAMINOPHEN 500 MG
1000 TABLET ORAL EVERY 6 HOURS PRN
Status: DISCONTINUED | OUTPATIENT
Start: 2025-01-03 | End: 2025-01-03

## 2025-01-03 RX ORDER — METOCLOPRAMIDE HYDROCHLORIDE 5 MG/ML
10 INJECTION INTRAMUSCULAR; INTRAVENOUS EVERY 6 HOURS PRN
Status: DISCONTINUED | OUTPATIENT
Start: 2025-01-03 | End: 2025-01-04 | Stop reason: HOSPADM

## 2025-01-03 RX ORDER — ACETAMINOPHEN 500 MG
1000 TABLET ORAL EVERY 6 HOURS PRN
Status: DISCONTINUED | OUTPATIENT
Start: 2025-01-03 | End: 2025-01-05

## 2025-01-03 RX ORDER — DEXTROSE, SODIUM CHLORIDE, SODIUM LACTATE, POTASSIUM CHLORIDE, AND CALCIUM CHLORIDE 5; .6; .31; .03; .02 G/100ML; G/100ML; G/100ML; G/100ML; G/100ML
INJECTION, SOLUTION INTRAVENOUS AS NEEDED
Status: DISCONTINUED | OUTPATIENT
Start: 2025-01-03 | End: 2025-01-04 | Stop reason: HOSPADM

## 2025-01-03 RX ORDER — SODIUM CHLORIDE, SODIUM LACTATE, POTASSIUM CHLORIDE, CALCIUM CHLORIDE 600; 310; 30; 20 MG/100ML; MG/100ML; MG/100ML; MG/100ML
INJECTION, SOLUTION INTRAVENOUS CONTINUOUS
Status: DISCONTINUED | OUTPATIENT
Start: 2025-01-03 | End: 2025-01-04 | Stop reason: HOSPADM

## 2025-01-03 RX ORDER — BISACODYL 10 MG
10 SUPPOSITORY, RECTAL RECTAL ONCE AS NEEDED
Status: DISCONTINUED | OUTPATIENT
Start: 2025-01-03 | End: 2025-01-05

## 2025-01-03 RX ORDER — ONDANSETRON 2 MG/ML
4 INJECTION INTRAMUSCULAR; INTRAVENOUS EVERY 6 HOURS PRN
Status: DISCONTINUED | OUTPATIENT
Start: 2025-01-03 | End: 2025-01-04 | Stop reason: HOSPADM

## 2025-01-03 RX ORDER — LIDOCAINE HYDROCHLORIDE 20 MG/ML
5 INJECTION, SOLUTION EPIDURAL; INFILTRATION; INTRACAUDAL; PERINEURAL AS NEEDED
Status: DISCONTINUED | OUTPATIENT
Start: 2025-01-03 | End: 2025-01-04

## 2025-01-03 RX ORDER — CALCIUM CARBONATE 500 MG/1
1000 TABLET, CHEWABLE ORAL EVERY 4 HOURS PRN
Status: DISCONTINUED | OUTPATIENT
Start: 2025-01-03 | End: 2025-01-04 | Stop reason: HOSPADM

## 2025-01-03 RX ORDER — LIDOCAINE HYDROCHLORIDE AND EPINEPHRINE 15; 5 MG/ML; UG/ML
5 INJECTION, SOLUTION EPIDURAL AS NEEDED
Status: DISCONTINUED | OUTPATIENT
Start: 2025-01-03 | End: 2025-01-04

## 2025-01-03 RX ORDER — SIMETHICONE 80 MG
80 TABLET,CHEWABLE ORAL 3 TIMES DAILY PRN
Status: DISCONTINUED | OUTPATIENT
Start: 2025-01-03 | End: 2025-01-05

## 2025-01-03 RX ORDER — IBUPROFEN 600 MG/1
600 TABLET, FILM COATED ORAL EVERY 6 HOURS
Status: DISCONTINUED | OUTPATIENT
Start: 2025-01-03 | End: 2025-01-05

## 2025-01-03 RX ORDER — TERBUTALINE SULFATE 1 MG/ML
0.25 INJECTION, SOLUTION SUBCUTANEOUS AS NEEDED
Status: DISCONTINUED | OUTPATIENT
Start: 2025-01-03 | End: 2025-01-04 | Stop reason: HOSPADM

## 2025-01-03 RX ORDER — CITRIC ACID/SODIUM CITRATE 334-500MG
30 SOLUTION, ORAL ORAL AS NEEDED
Status: DISCONTINUED | OUTPATIENT
Start: 2025-01-03 | End: 2025-01-04 | Stop reason: HOSPADM

## 2025-01-03 RX ORDER — BUPIVACAINE HYDROCHLORIDE 2.5 MG/ML
30 INJECTION, SOLUTION EPIDURAL; INFILTRATION; INTRACAUDAL AS NEEDED
Status: DISCONTINUED | OUTPATIENT
Start: 2025-01-03 | End: 2025-01-04

## 2025-01-03 RX ORDER — BUPIVACAINE HCL/0.9 % NACL/PF 0.25 %
5 PLASTIC BAG, INJECTION (ML) EPIDURAL AS NEEDED
Status: DISCONTINUED | OUTPATIENT
Start: 2025-01-03 | End: 2025-01-04

## 2025-01-03 RX ORDER — SODIUM CHLORIDE 9 MG/ML
10 INJECTION, SOLUTION INTRAMUSCULAR; INTRAVENOUS; SUBCUTANEOUS AS NEEDED
Status: DISCONTINUED | OUTPATIENT
Start: 2025-01-03 | End: 2025-01-04

## 2025-01-03 RX ORDER — LIDOCAINE HYDROCHLORIDE AND EPINEPHRINE 15; 5 MG/ML; UG/ML
INJECTION, SOLUTION EPIDURAL AS NEEDED
Status: DISCONTINUED | OUTPATIENT
Start: 2025-01-03 | End: 2025-01-03 | Stop reason: SURG

## 2025-01-03 RX ADMIN — LIDOCAINE HYDROCHLORIDE AND EPINEPHRINE 3 ML: 15; 5 INJECTION, SOLUTION EPIDURAL at 18:07:00

## 2025-01-03 RX ADMIN — LIDOCAINE HYDROCHLORIDE AND EPINEPHRINE 2 ML: 15; 5 INJECTION, SOLUTION EPIDURAL at 18:10:00

## 2025-01-03 NOTE — ANESTHESIA PREPROCEDURE EVALUATION
PRE-OP EVALUATION    Patient Name: Amanda Noel Brauweiler    Admit Diagnosis: pregnancy  Pregnancy (HCC)    Pre-op Diagnosis: * No surgery found *        Anesthesia Procedure: LABOR ANALGESIA    * Surgery not found *    Pre-op vitals reviewed.  Temp: 98.9 °F (37.2 °C)  Pulse: 80  Resp: 18  BP: 130/68     Body mass index is 35.85 kg/m².    Current medications reviewed.  Hospital Medications:   lactated ringers infusion   Intravenous Continuous    dextrose in lactated ringers 5% infusion   Intravenous PRN    lactated ringers IV bolus 500 mL  500 mL Intravenous PRN    acetaminophen (Tylenol Extra Strength) tab 500 mg  500 mg Oral Q6H PRN    acetaminophen (Tylenol Extra Strength) tab 1,000 mg  1,000 mg Oral Q6H PRN    ibuprofen (Motrin) tab 600 mg  600 mg Oral Once PRN    ondansetron (Zofran) 4 MG/2ML injection 4 mg  4 mg Intravenous Q6H PRN    oxyTOCIN in sodium chloride 0.9% (Pitocin) 30 Units/500mL infusion premix  62.5-900 andrea-units/min Intravenous Continuous    terbutaline (Brethine) 1 MG/ML injection 0.25 mg  0.25 mg Subcutaneous PRN    sodium citrate-citric acid (Bicitra) 500-334 MG/5ML oral solution 30 mL  30 mL Oral PRN    metoclopramide (Reglan) 5 mg/mL injection 10 mg  10 mg Intravenous Q6H PRN    calcium carbonate (Tums) chewable tab 1,000 mg  1,000 mg Oral Q4H PRN    oxyTOCIN in sodium chloride 0.9% (Pitocin) 30 Units/500mL infusion premix  0.5-20 andrea-units/min Intravenous Continuous    lactated ringers IV bolus 1,000 mL  1,000 mL Intravenous Once    fentaNYL-bupivacaine 2 mcg/mL-0.125% in sodium chloride 0.9% 100 mL EPIDURAL infusion premix  12 mL/hr Epidural Continuous    fentaNYL (Sublimaze) 50 mcg/mL injection 100 mcg  100 mcg Epidural Once    lidocaine 1.5%-EPINEPHrine 1:200,000 (Xylocaine-Epinephrine) injection  5 mL Injection PRN    bupivacaine PF (Marcaine) 0.25% injection  30 mL Injection PRN    lidocaine PF (Xylocaine-MPF) 2% injection  5 mL Injection PRN    sodium chloride 0.9% PF injection  10 mL  10 mL Injection PRN    ePHEDrine (PF) 25 MG/5 ML injection 5 mg  5 mg Intravenous PRN    nalbuphine (Nubain) 10 mg/mL injection 2.5 mg  2.5 mg Intravenous Q15 Min PRN       Outpatient Medications:   Prescriptions Prior to Admission[1]    Allergies: Patient has no known allergies.      Anesthesia Evaluation    Patient summary reviewed.    Anesthetic Complications           GI/Hepatic/Renal                                 Cardiovascular                  (+) hypertension                                     Endo/Other      (-) diabetes                            Pulmonary      (-) asthma                     Neuro/Psych                                      Past Surgical History:   Procedure Laterality Date    D & c      October 2021    [place] teeth removed      2010     Social History     Socioeconomic History    Marital status:    Tobacco Use    Smoking status: Never    Smokeless tobacco: Never   Vaping Use    Vaping status: Never Used   Substance and Sexual Activity    Alcohol use: Not Currently     Comment: occ    Drug use: Never    Sexual activity: Yes     Partners: Male   Other Topics Concern    Caffeine Concern Yes    Exercise No    Seat Belt Yes     History   Drug Use Unknown     Available pre-op labs reviewed.  Lab Results   Component Value Date    WBC 11.4 (H) 01/03/2025    RBC 4.93 01/03/2025    HGB 14.2 01/03/2025    HCT 42.7 01/03/2025    MCV 86.6 01/03/2025    MCH 28.8 01/03/2025    MCHC 33.3 01/03/2025    RDW 13.9 01/03/2025    .0 01/03/2025               Airway      Mallampati: III  Mouth opening: >3 FB  TM distance: > 6 cm  Neck ROM: full Cardiovascular    Cardiovascular exam normal.         Dental    Dentition appears grossly intact         Pulmonary    Pulmonary exam normal.                 Other findings              ASA: 2   Plan: epidural           Comment: R/B/A were discussed with the patient including but not limited to the risk of infection, bleeding and post dural puncture  headache. All questions answered and the patient agreed to proceed  Plan/risks discussed with: patient                Present on Admission:  **None**             [1]   Facility-Administered Medications Prior to Admission   Medication Dose Route Frequency Provider Last Rate Last Admin    [COMPLETED] Rho D immune globulin (RhoGAM) IM injection 300 mcg  300 mcg Intramuscular Once    300 mcg at 10/25/24 0833     Medications Prior to Admission   Medication Sig Dispense Refill Last Dose/Taking    famotidine (PEPCID AC MAXIMUM STRENGTH) 20 MG Oral Tab    1/2/2025    Prenatal Vit-Fe Fumarate-FA (PRENATAL OR) Take by mouth.   1/2/2025    aspirin 81 MG Oral Tab EC Take 1 tablet (81 mg total) by mouth daily.

## 2025-01-03 NOTE — TELEPHONE ENCOUNTER
Received a valid Release of Information via email for [employer]; signed on 12/20/24. Scanned into patients chart and archived.

## 2025-01-03 NOTE — PLAN OF CARE
Problem: BIRTH - VAGINAL/ SECTION  Goal: Fetal and maternal status remain reassuring during the birth process  Description: INTERVENTIONS:  - Monitor vital signs  - Monitor fetal heart rate  - Monitor uterine activity  - Monitor labor progression (vaginal delivery)  - DVT prophylaxis (C/S delivery)  - Surgical antibiotic prophylaxis (C/S delivery)  Outcome: Progressing     Problem: PAIN - ADULT  Goal: Verbalizes/displays adequate comfort level or patient's stated pain goal  Description: INTERVENTIONS:  - Encourage pt to monitor pain and request assistance  - Assess pain using appropriate pain scale  - Administer analgesics based on type and severity of pain and evaluate response  - Implement non-pharmacological measures as appropriate and evaluate response  - Consider cultural and social influences on pain and pain management  - Manage/alleviate anxiety  - Utilize distraction and/or relaxation techniques  - Monitor for opioid side effects  - Notify MD/LIP if interventions unsuccessful or patient reports new pain  - Anticipate increased pain with activity and pre-medicate as appropriate  Outcome: Progressing     Problem: ANXIETY  Goal: Will report anxiety at manageable levels  Description: INTERVENTIONS:  - Administer medication as ordered  - Teach and rehearse alternative coping skills  - Provide emotional support with 1:1 interaction with staff  Outcome: Progressing     Problem: Patient/Family Goals  Goal: Patient/Family Long Term Goal  Description: Patient's Long Term Goal: Uncomplicated vaginal delivery    Interventions:  VS per protocol  I&O  Ice chips and sips as tolerated  EFM per protocol  Maintain IV as ordered  Antibiotics as needed per protocol  Informed consent    Outcome: Progressing  Goal: Patient/Family Short Term Goal  Description: Patient's Short Term Goal: Adequate pain control with delivery of infant  Interventions:  Pain assessment scores as ordered  Patient scores pain a \"3\" or  less  Multidisciplinary care   Nonpharmacologic comfort measures      - See additional Care Plan goals for specific interventions  Outcome: Progressing

## 2025-01-03 NOTE — PROGRESS NOTES
[facility]  Obstetrics and Gynecology    OB/GYN: Intrapartum Progress Note     SUBJECTIVE:  Feeling more regular contractions.     OBJECTIVE:  Vitals:    25 1315 25 1317   BP: 130/68    Pulse: 80    Resp: 18    Temp: 98.9 °F (37.2 °C)    TempSrc: Oral    Weight:  196 lb (88.9 kg)   Height: 62\"        Physical Exam:  General: AAO. NAD.   Fundus  gravid.    FHT: Baseline 130s, moderate variability, + accelerations, - decelerations    [place]: q 2-3 min  SVE: 3/50/-3    Interventions Performed:    Cervical exam    Amniotomy   IUPC placement     ASSESSMENT/PLAN:  Amanda Noel Brauweiler is a 34 year old  female at 38w6d Estimated Date of Delivery: 25 who is being admitted for induction of labor for nonreactive NST (minimal variability and intermittent late decelerations) along with decreased fetal movement at term.      Labor  - Pitocin started at 14:18 - currently at 8U  - Pain: desires XI eventually  - EFW: 7.5# by Leopolds  - RH negative, RI.   - GBS negative  - Antepartum conditions                H/o GHTN                Rh negative, s/p Rhogam at 28 weeks      Plan: Discussed recommendation for epidural anesthesia     Jeanette Marcos MD   [facility] - OBGYN

## 2025-01-03 NOTE — H&P
[facility]  Obstetrics and Gynecology  History & Physical    Amanda Noel Brauweiler Patient Status:  Inpatient    11/3/1990 MRN [id number]   Location [facility] LABOR & DELIVERY Attending Jeanette Marcos MD   Hospital Day 0 PCP Ijeoma Chowdhury MD     CC: Patient is here for induction of labor    SUBJECTIVE:    Amanda Noel Brauweiler is a 34 year old  female at 38w6d Estimated Date of Delivery: 25 who is being admitted for induction of labor for nonreactive NST (minimal variability and intermittent late decelerations) along with decreased fetal movement at term.     She endorses fetal movement but notes it feels different compared to normal. Denies leakage of fluid, and vaginal bleeding/spotting. Endorses uterine contractions. Denies. Nausea, Vomiting, headache, vision changes and RUQ/Epigastric pain.     She was seen in the office today for routine prenatal care appointment and discussed concern of decreased fetal movement. She was placed on the NST and found to have a non-reactive NST with minimal variability and intermittent late decelerations after regular uterine contractions. Delivery was recommended. She is endorsing active fetal movement at this time.     Antepartum Conditions:  Patient Active Problem List    Diagnosis    Pregnancy (HCC)    Rh negative state in antepartum period (HCC)     Rhogam given early 2024  [ X ] Rhogam 28 wks      History of gestational hypertension     IOL at 38 weeks due to GHTN, IOL, episiotomy, failed vacuum  [ x ] BASA has been taking           LOS Confirmation  LMP: Patient's last menstrual period was 2024 (approximate).  LOS: 2025, by Last Menstrual Period       Obstetric History:   OB History    Para Term  AB Living   5 2 2 0 2 2   SAB IAB Ectopic Multiple Live Births   2 0 0 0 2      # Outcome Date GA Lbr Dyllan/2nd Weight Sex Type Anes PTL Lv   5 Current            4 SAB 24 6w0d    SAB      3 Term 10/06/22 40w2d  08:38 / 01:36 8 lb 6.8 oz (3.82 kg) M NORMAL SPONT EPI N ROSITA      Complications: Variable decelerations   2 SAB 10/08/21     SAB      1 Term 19 38w0d  6 lb 3.2 oz (2.812 kg) F NORMAL SPONT EPI N ROSITA     Past Medical History:   Past Medical History:    Back problem    scoliosis    Gestational hypertension (HCC)    Gestational hypertension (HCC)    34 weeks     (normal spontaneous vaginal delivery) (HCC)    Pregnancy-induced hypertension (HCC)    GHTN with 1st pregnancy    Rh negative state in antepartum period (HCC)    Got rhogam at 21wks [  x] plan for Ab screen at 30-32wks and rhogam again 32-34wks    Visual impairment    contacts/glasses     Past Social History:   Past Surgical History:   Procedure Laterality Date    D & c      2021    [place] teeth removed           Family History:   Family History   Problem Relation Age of Onset    Hypertension Father     Skin cancer Father     Hypertension Mother     Breast Cancer Mother     Breast Cancer Maternal Grandmother     Skin cancer Maternal Grandfather     Breast Cancer Paternal Grandmother     Skin cancer Maternal Aunt     Stroke Maternal Uncle      Social History:   Social History     Tobacco Use    Smoking status: Never    Smokeless tobacco: Never   Substance Use Topics    Alcohol use: Not Currently     Comment: occ       Home Meds: Prescriptions Prior to Admission[1]  Allergies: Allergies[2]    OBJECTIVE:    Temp:  [98.9 °F (37.2 °C)] 98.9 °F (37.2 °C)  Pulse:  [80-82] 80  Resp:  [18] 18  BP: (112-130)/(64-68) 130/68  Body mass index is 35.85 kg/m².    General: AAO. NAD  Lungs: chest clear, no wheezing, rales, normal symmetric air entry  CV: S1, S2 normal, no murmur, click, rub or gallop, regular rate and rhythm  Abdomen: FHT present, gravid   Extremities: negative edema bilaterally, negative calf tenderness bilaterally, Juan's sign negative bilaterally     FHT: Baseline 140s, moderate variability, + accelerations, - decelerations  TOCO: q  3-5 minutes    SVE: 2-3 / 50 / -2    Leopolds: cephalic, EFW 7.5 lbs      Prenatal Labs Brief Review   Blood Type:   Lab Results   Component Value Date    ABO A 07/10/2024    RH Negative 07/10/2024     GBS:  Negative    Inpatient labs:       ASSESSMENT/ PLAN:    Amanda Noel Brauweiler is a 34 year old  female at 38w6d Estimated Date of Delivery: 25 who is being admitted for induction of labor for nonreactive NST (minimal variability and intermittent late decelerations) along with decreased fetal movement at term.     Labor  - Pitocin  - Pain: desires XI eventually  - EFW: 7.5# by Leopolds  - RH negative, RI.   - GBS negative  - Antepartum conditions   H/o GHTN   Rh negative, s/p Rhogam at 28 weeks      Plan: Discussed with patient that fetal tracing is now reactive and reassuring, however given decreased fetal movement and previous non-reactive NST would recommend proceeding with induction of labor. Patient to eat a small snack then plan to Initiate induction of labor.     Risks, benefits, alternatives and possible complications have been discussed in detail with the patient.  Pre-admission, admission, and post admission procedures and expectations were discussed in detail.  All questions answered, all appropriate consents will be signed at the Hospital. Admission is planned for today.  anticipated.    Jeanette Marcos MD   [facility] - OBGYN      Note to patient and family   The  [employer] Cures Act makes medical notes available to patients in the interest of transparency.  However, please be advised that this is a medical document.  It is intended as [id number] communication.  It is written and medical language may contain abbreviations or verbiage that are technical and unfamiliar.  It may appear blunt or direct.  Medical documents are intended to carry relevant information, facts as evident, and the clinical opinion of the practitioner.                      [1]   Facility-Administered Medications Prior  to Admission   Medication Dose Route Frequency Provider Last Rate Last Admin    [COMPLETED] Rho D immune globulin (RhoGAM) IM injection 300 mcg  300 mcg Intramuscular Once    300 mcg at 10/25/24 0833     Medications Prior to Admission   Medication Sig Dispense Refill Last Dose/Taking    famotidine (PEPCID AC MAXIMUM STRENGTH) 20 MG Oral Tab    1/2/2025    Prenatal Vit-Fe Fumarate-FA (PRENATAL OR) Take by mouth.   1/2/2025    aspirin 81 MG Oral Tab EC Take 1 tablet (81 mg total) by mouth daily.      [2] No Known Allergies

## 2025-01-03 NOTE — PROGRESS NOTES
[facility]  Obstetrics and Gynecology  Routine OB Visit Progress Note    Subjective:     Amanda Noel Brauweiler is a 34 year old  at 38w6d who presents for routine OB visit.  Patient reports doing well. Denies contractions, vaginal bleeding or leakage of fluid. Pt states fetal movement has been different; present but different. Will place on NST.     SVE 2-3/50/-2, midposition, soft   Lab Results   Component Value Date    GBS Negative 2024        Objective:   /64   Pulse 82   Ht 62\"   Wt 196 lb 6 oz (89.1 kg)   LMP 2024 (Approximate)   BMI 35.92 kg/m²   ABD: gravid, nontender  FHT: 132       Cephalic by Leopold's   NST nonreassuring; minimal variability and late decels    Assessment:     Amanda Noel Brauweiler is a 34 year old  at 38w6d who presents for routine OB visit. Overall doing well.     Problem List Items Addressed This Visit    None  Visit Diagnoses       Prenatal care, antepartum (HCC)    -  Primary    Decreased fetal movement affecting management of pregnancy, antepartum, single or unspecified fetus (HCC)        Relevant Orders    FETAL NON-STRESS TEST [facility] ONLY 41337 (Completed)            Plan:     Patient Active Problem List    Diagnosis    Rh negative state in antepartum period (HCC)     Rhogam given early 2024  [ X ] Rhogam 28 wks      History of gestational hypertension     IOL at 38 weeks due to GHTN, IOL, episiotomy, failed vacuum  [ x ] MARJORIE has been taking       NST nonreassuring with minimal variability, late decelerations; pt escorted to labor and delivery. Charge nurse and OB (Dr. Marcos) on call notified.     Albina Dixon MD   [facility] - OBGYN    Note to patient and family   The 21st Century Cures Act makes medical notes available to patients in the interest of transparency.  However, please be advised that this is a medical document.  It is intended as gnen-xg-kaae communication.  It is written and medical language may contain abbreviations or  verbiage that are technical and unfamiliar.  It may appear blunt or direct.  Medical documents are intended to carry relevant information, facts as evident, and the clinical opinion of the practitioner.

## 2025-01-04 LAB
BASOPHILS # BLD AUTO: 0.06 X10(3) UL (ref 0–0.2)
BASOPHILS NFR BLD AUTO: 0.4 %
EOSINOPHIL # BLD AUTO: 0.06 X10(3) UL (ref 0–0.7)
EOSINOPHIL NFR BLD AUTO: 0.4 %
ERYTHROCYTE [DISTWIDTH] IN BLOOD BY AUTOMATED COUNT: 14 %
HCT VFR BLD AUTO: 35 %
HGB BLD-MCNC: 11.4 G/DL
IMM GRANULOCYTES # BLD AUTO: 0.11 X10(3) UL (ref 0–1)
IMM GRANULOCYTES NFR BLD: 0.7 %
LYMPHOCYTES # BLD AUTO: 2.1 X10(3) UL (ref 1–4)
LYMPHOCYTES NFR BLD AUTO: 14.3 %
MCH RBC QN AUTO: 28.9 PG (ref 26–34)
MCHC RBC AUTO-ENTMCNC: 32.6 G/DL (ref 31–37)
MCV RBC AUTO: 88.8 FL
MONOCYTES # BLD AUTO: 0.99 X10(3) UL (ref 0.1–1)
MONOCYTES NFR BLD AUTO: 6.7 %
NEUTROPHILS # BLD AUTO: 11.36 X10 (3) UL (ref 1.5–7.7)
NEUTROPHILS # BLD AUTO: 11.36 X10(3) UL (ref 1.5–7.7)
NEUTROPHILS NFR BLD AUTO: 77.5 %
PLATELET # BLD AUTO: 216 10(3)UL (ref 150–450)
RBC # BLD AUTO: 3.94 X10(6)UL
WBC # BLD AUTO: 14.7 X10(3) UL (ref 4–11)

## 2025-01-04 RX ORDER — CHOLECALCIFEROL (VITAMIN D3) 25 MCG
1 TABLET,CHEWABLE ORAL DAILY
Status: DISCONTINUED | OUTPATIENT
Start: 2025-01-04 | End: 2025-01-04

## 2025-01-04 RX ORDER — IBUPROFEN 600 MG/1
600 TABLET, FILM COATED ORAL EVERY 6 HOURS
Qty: 60 TABLET | Refills: 0 | Status: SHIPPED | OUTPATIENT
Start: 2025-01-04

## 2025-01-04 RX ORDER — CHOLECALCIFEROL (VITAMIN D3) 25 MCG
1 TABLET,CHEWABLE ORAL DAILY
Status: DISCONTINUED | OUTPATIENT
Start: 2025-01-04 | End: 2025-01-05

## 2025-01-04 NOTE — L&D DELIVERY NOTE
Brauweiler, Girl [[id number]]      Labor Events     labor?: No   steroids?: None  Rupture date/time: 1/3/2025 1645     Rupture type: AROM  Fluid color: Clear  Labor type: Induced Onset of Labor  Induction: Oxytocin, AROM  Induction comment: Non-Reassuring FHT in office       Labor Event Times    Labor onset date/time: 1/3/2025 1645       [place] Presentation    Presentation: Vertex       Operative Delivery    No data filed       Shoulder Dystocia    No data filed       Anesthesia    Method: Epidural              [place] Delivery      Head delivery date/time: 1/3/2025 23:01:20   Delivery date/time:  1/3/25 23:02:00   Delivery type: Normal spontaneous vaginal delivery    Details:     Delivery location: delivery room       Delivery Providers    Delivering Clinician: Jeanette Marcos MD   Delivery personnel:  Provider Role    Baby Nurse   Dedra Santos, RN Delivery Nurse             Cord    Vessels: 3 Vessels  Complications: None  Timed cord clamping: Yes  Time in sec: 60  Cord blood disposition: to lab  Gases sent?: No       [place] Measurements      Weight: 3310 g 7 lb 4.8 oz                Placenta    Date/time: 1/3/2025 2305  Removal: Spontaneous  Appearance: Intact  Disposition: held for future pathology       Apgars    Living status: Living   Apgar Scoring Key:    0 1 2    Skin color Blue or pale Acrocyanotic Completely pink    Heart rate Absent <100 bpm >100 bpm    Reflex irritability No response Grimace Cry or active withdrawal    Muscle tone Limp Some flexion Active motion    Respiratory effort Absent Weak cry; hypoventilation Good, crying              1 Minute:  5 Minute:  10 Minute:  15 Minute:  20 Minute:      Skin color:        Heart rate:        Reflex irritablity:        Muscle tone:        Respiratory effort:        Total:                Skin to Skin    No data filed       Vaginal Count    No data filed       Lacerations    No data filed            Vaginal Delivery  Note          Amanda Noel Brauweiler Patient Status:  Inpatient    11/3/1990 MRN [id number]   [facility] LABOR & DELIVERY Attending Jeanette Marcos MD   Hosp Day # 0 PCP Ijeoma Chowdhury MD     Date of Delivery: 25    Pre Op Dx:  IUP at Term    Post Op Dx: Same - delivered    Op: Normal Spontaneous Vaginal Delivery    Surgeon: Jeanette Marcos MD        Anesthesia: Epidural      Indications:  Patient is a 34 year old  at 38w6d who presented for induction of labor after non-reactive NST and decreased fetal movement at term. Pitocin was initiated. She progressed to complete cervical dilation and pushed for approximately 20 minutes.     Findings:    Sex: female     Weight:3.31 kg      Apgars: 8/9      Lacerations: no perineal laceration      Procedure:  The patients was placed in the dorsolithotomy position and prepped.  She was encouraged to push.  As the head was delivered in OA position, the legs were lowered and the perineum was supported to decrease the risk of tearing. The patient previously had an episiotomy in each of her two deliveries and the scar tissue was stretched manually. The shoulders rotated easily and delivery was completed without complication.  Bulb suction was performed.  The cord was doubly clamped then cut after 30 seconds of cord pulsation noted.  The baby was placed on the mother's abdomen at her request.  The cord blood was sampled. Placenta delivered spontaneosly by uterine massage. IV Pitocin was initiated. The uterus was explored and evacuation of blood clots noted. Uterus noted to be firm. Good hemostasis noted. The perineum, vaginal mucosa and cervix was then examined. No perineal lacerations were noted. Bleeding was minimal.  The patient was then moved to the supine position in stable condition.  Sponge and instrument counts were correct.    Complications:  None     Mother and infant in good condition.    It was previously noted in the patient's G2 delivery  note that given the rigidity of the perineal scar tissue from previous episiotomy x2 repairs, that in the future the patient be offered induction at 39 weeks of gestation and the delivering clinician have a low threshold to cut an episiotomy with .     Jeanette Marcos MD   [facility] - OBGYN      Note to patient and family   The  [employer] Cures Act makes medical notes available to patients in the interest of transparency.  However, please be advised that this is a medical document.  It is intended as [id number]-rw-[id number] communication.  It is written and medical language may contain abbreviations or verbiage that are technical and unfamiliar.  It may appear blunt or direct.  Medical documents are intended to carry relevant information, facts as evident, and the clinical opinion of the practitioner.

## 2025-01-04 NOTE — DISCHARGE INSTRUCTIONS
Post Vaginal Delivery Home Care Instructions     We hope you were pleased with your care at [facility].  We wish you the best outcome and overall experience with the delivery of your baby.  These instructions will help to minimize pain, limit the risk for an infection, and improve the likelihood of a successful recovery.    What to Expect:  Abdominal cramping after delivery especially if you are breastfeeding.   Vaginal bleeding for about 4-6 weeks that may be followed by a yellow or white discharge for a few more weeks.  Your period will resume in approximately 6-8 weeks, unless you are breastfeeding.    If you are bottle feeding, you may notice breast engorgement in about 3 days.  Your breast may be sore and hard. Please wear a tight fitted bra or sports bra for 24-36 hours to help prevent your breast from producing milk, and use ice packs to relive any discomfort.  If you are breastfeeding, nipple dryness is very common the first few days.    Constipation is common after having a baby.  Please increase fluid and fiber in your diet.      Over-The-Counter Medication  Non-prescription anti-inflammatory medications can also help to ease the pain.  You may take Aleve, Tylenol or Ibuprofen   Colace or Metamucil for Constipation  Lanolin for dry nipples  Tucks, Witch Hazel and Epifoam for Episiotomy discomfort.   Drink a full glass of water with oral medication and take as directed.    Wound Care  The following instructions will promote proper healing and help to prevent infection  Episiotomy Care: Sitz Bath for 15mins, 2-3 times a day,    Bathing/Showers  You may resume showers  No baths, swimming, hot tubs until your post-partum visit    Home Medication  Resume your home medications as instructed    Diet  Resume your normal diet    Activity  Refrain from vaginal intercourse, vaginal suppositories, tampon use or douches until after your post-partum visit.  No exercising for 4 weeks  You may climb stairs minimally  for the 1st week.    Do not do heavy housework for at least 2-3 weeks    Return to Work or School  You may return to work in approximately 6 weeks  Contact your obstetrician’s office, if you need a medical release. ([phone])    Driving  Avoid driving if you are taking narcotics for pain relief.    Follow-up Appointment with Your Obstetrician  Call your obstetrician’s office today for an appointment in four weeks.    The number is [phone].  Verify your appointment date, day, time, and location.  At your 1st post-partum office visit:  Your progress will be evaluated, findings reviewed, and any additional concerns and instructions will be discussed.    Questions or Concerns  Call your obstetrician’s office if you experience the following:  Severe pain not controlled by pain medication  Foul smelling vaginal discharge  Heavy bleeding  Shortness of breath  Fever  Redness, increased swelling or drainage from your incision  Crying and periods of sadness that prevents you from caring for yourself and your baby  Burning sensation during urination or inability to urinate  Swelling, redness or abnormal warmth to your leg/calf  Please call (). If your call is made after office hours, a physician will be available to help you.  There is always a provider covering our patients.    Thank you for coming to [facility] to start your new family.  The nurses and the anesthesiologists try very hard to make sure you receive the best care possible.  Your trust in them as well as us is greatly appreciated.    Thanks so much,   [facility]

## 2025-01-04 NOTE — PROGRESS NOTES
Labor Analgesia Follow Up Note    Patient underwent epidural anesthesia for labor analgesia,    Placenta Date/Time: 1/3/2025 11:05 PM    Delivery Date/Time:: 1/3/2025  11:02 PM    /70 (BP Location: Right arm)   Pulse 68   Temp 98.5 °F (36.9 °C) (Oral)   Resp 16   Ht 1.575 m (5' 2\")   Wt 88.9 kg (196 lb)   LMP 04/06/2024 (Approximate)   SpO2 100%   Breastfeeding Yes   BMI 35.85 kg/m²     Assessment:  Patient seen and no apparent anesthesia related complications.    Thank you for asking us to participate in the care of your patient.

## 2025-01-04 NOTE — ANESTHESIA PROCEDURE NOTES
Labor Analgesia    Date/Time: 1/3/2025 5:47 PM    Performed by: Taqueria Chamberlain MD  Authorized by: Taqueria Chamberlain MD      General Information and Staff    Start Time:  1/3/2025 5:47 PM  End Time:  1/3/2025 6:04 PM  Anesthesiologist:  Taqueria Chamberlain MD  Performed by:  Anesthesiologist  Patient Location:  OB  Site Identification: surface landmarks    Reason for Block: labor epidural    Preanesthetic Checklist: patient identified, IV checked, risks and benefits discussed, monitors and equipment checked, pre-op evaluation, timeout performed, IV bolus, anesthesia consent and sterile technique used      Procedure Details    Patient Position:  Sitting  Prep: ChloraPrep    Monitoring:  Heart rate and continuous pulse ox  Approach:  Midline    Epidural Needle    Injection Technique:  VANDANA saline  Needle Type:  Tuohy  Needle Gauge:  17 G  Needle Length:  3.375 in  Needle Insertion Depth:  5  Location:  L3-4    Spinal Needle      Catheter    Catheter Type:  End hole  Catheter Size:  19 G  Catheter at Skin Depth:  11.5  Test Dose:  Negative    Assessment      Additional Comments     Scoliotic patient (noted at high school age by imaging), midline shift to the left per palpation. Uneventful insertion though.

## 2025-01-04 NOTE — PROGRESS NOTES
Pt care endorsed to Dedra DIAZ RN. Pt denies any complaints. POC discussed with pt, pt denies questions. Call light within reach.

## 2025-01-04 NOTE — PROGRESS NOTES
Pt transferred via wheelchair, carrying infant, both in stable condition to room 2217. FOB @ their side. ID bands checked and verified. Report given to CHARLEY Rivas.

## 2025-01-04 NOTE — PROGRESS NOTES
[facility]  Obstetrics and Gynecology    OB/GYN: Postpartum Progress Note     SUBJECTIVE:  Patient is a 34 year old  female.   Doing well.  Denies fever, chills, N, V, chest pain and SOB. Bleeding has been stable.   OBJECTIVE:  Vitals:    25 0046 25 0101 25 0149 25 0805   BP: 134/71 115/57 117/72 118/70   Pulse: 81 70 76 68   Resp: 18 18 17 16   Temp:  97.7 °F (36.5 °C) 98.4 °F (36.9 °C) 98.5 °F (36.9 °C)   TempSrc:  Oral Oral Oral   SpO2:       Weight:       Height:           Physical Exam:    General:    alert, appears stated age, and cooperative   Lochia:  appropriate   Uterine Fundus:   firm below umbilicus   DVT Evaluation:  No evidence of DVT seen on physical exam.     Urinary output is adequate.   I/O last 3 completed shifts:  In: 1050 [I.V.:1050]  Out: 1831 [Urine:1800; Blood:31]      Labs:  Recent Labs   Lab 25  0915   RBC 3.94   HGB 11.4*   HCT 35.0   MCV 88.8   MCH 28.9   MCHC 32.6   RDW 14.0   NEPRELIM 11.36*   WBC 14.7*   .0       ASSESSMENT/PLAN:  Patient is a 34 year old  female who is s/p  PPD#1.     Doing well   Continue routine postpartum care  Vitals per routine   Encourage ambulation   Desires to discuss contraception at postpartum visit   Hgb appropriate  Rh negative; needs rhogam work up  Plan for discharge to home tomorrow  Follow up in 6 weeks      Albina Dixon MD   [facility] - OBGYN      Note to patient and family   The 21st Century Cures Act makes medical notes available to patients in the interest of transparency.  However, please be advised that this is a medical document.  It is intended as zbjm-dh-[id number] communication.  It is written and medical language may contain abbreviations or verbiage that are technical and unfamiliar.  It may appear blunt or direct.  Medical documents are intended to carry relevant information, facts as evident, and the clinical opinion of the practitioner.

## 2025-01-04 NOTE — PROGRESS NOTES
[facility]  Obstetrics and Gynecology    OB/GYN: Intrapartum Progress Note     SUBJECTIVE:  Feeling lightheaded and nauseated. Ephedrine x2 administered.     OBJECTIVE:  Vitals:    25   BP: 108/52 96/50 108/53 98/50   Pulse: 81 71 80 71   Resp:       Temp:       TempSrc:       SpO2:       Weight:       Height:           Physical Exam:  General: AAO. NAD.   Fundus  gravid.    FHT: Baseline 130s, moderate variability, + accelerations, - decelerations    [place]: q 2-3 min  SVE: 5/80/0    Interventions Performed:    Cervical exam     ASSESSMENT/PLAN:  Amanda Noel Brauweiler is a 34 year old  female at 38w6d Estimated Date of Delivery: 25 who is being admitted for induction of labor for nonreactive NST (minimal variability and intermittent late decelerations) along with decreased fetal movement at term.      Labor  - Pitocin started at 14:18 - currently at 10U, increased to 12U   - Pain: XI in place   - EFW: 7.5# by [place]  - RH negative, RI.   - GBS negative  - Antepartum conditions                H/o GHTN                Rh negative, s/p Rhogam at 28 weeks      Plan: Continue pitocin. Anesthesia called to bedside for assistance with right-sided hotspot of epidural.     Jeanette Marcos MD   [facility] - OBGYN

## 2025-01-04 NOTE — PLAN OF CARE
Problem: POSTPARTUM  Goal: Long Term Goal:Experiences normal postpartum course  Description: INTERVENTIONS:  - Assess and monitor vital signs and lab values.  - Assess fundus and lochia.  - Provide ice/sitz baths for perineum discomfort.  - Monitor healing of incision/episiotomy/laceration, and assess for signs and symptoms of infection and hematoma.  - Assess bladder function and monitor for bladder distention.  - Provide/instruct/assist with pericare as needed.  - Provide VTE prophylaxis as needed.  - Monitor bowel function.  - Encourage ambulation and provide assistance as needed.  - Assess and monitor emotional status and provide social service/psych resources as needed.  - Utilize standard precautions and use personal protective equipment as indicated. Ensure aseptic care of all intravenous lines and invasive tubes/drains.  - Obtain immunization and exposure to communicable diseases history.  Outcome: Progressing  Goal: Optimize infant feeding at the breast  Description: INTERVENTIONS:  - Initiate breast feeding within first hour after birth.   - Monitor effectiveness of current breast feeding efforts.  - Assess support systems available to mother/family.  - Identify cultural beliefs/practices regarding lactation, letdown techniques, maternal food preferences.  - Assess mother's knowledge and previous experience with breast feeding.  - Provide information as needed about early infant feeding cues (e.g., rooting, lip smacking, sucking fingers/hand) versus late cue of crying.  - Discuss/demonstrate breast feeding aids (e.g., infant sling, nursing footstool/pillows, and breast pumps).  - Encourage mother/other family members to express feelings/concerns, and actively listen.  - Educate father/SO about benefits of breast feeding and how to manage common lactation challenges.  - Recommend avoidance of specific medications or substances incompatible with breast feeding.  - Assess and monitor for signs of nipple  pain/trauma.  - Instruct and provide assistance with proper latch.  - Review techniques for milk expression (breast pumping) and storage of breast milk. Provide pumping equipment/supplies, instructions and assistance, as needed.  - Encourage rooming-in and breast feeding on demand.  - Encourage skin-to-skin contact.  - Provide LC support as needed.  - Assess for and manage engorgement.  - Provide breast feeding education handouts and information on community breast feeding support.   Outcome: Progressing  Goal: Establishment of adequate milk supply with medication/procedure interruptions  Description: INTERVENTIONS:  - Review techniques for milk expression (breast pumping).   - Provide pumping equipment/supplies, instructions, and assistance until it is safe to breastfeed infant.  Outcome: Progressing  Goal: Appropriate maternal -  bonding  Description: INTERVENTIONS:  - Assess caregiver- interactions.  - Assess caregiver's emotional status and coping mechanisms.  - Encourage caregiver to participate in  daily care.  - Assess support systems available to mother/family.  - Provide /case management support as needed.  Outcome: Progressing     Problem: NEUROLOGICAL - ADULT  Goal: Achieves stable or improved neurological status  Description: INTERVENTIONS  - Assess for and report changes in neurological status  - Initiate measures to prevent increased intracranial pressure  - Maintain blood pressure and fluid volume within ordered parameters to optimize cerebral perfusion and minimize risk of hemorrhage  - Monitor temperature, glucose, and sodium. Initiate appropriate interventions as ordered  Outcome: Progressing  Goal: Absence of seizures  Description: INTERVENTIONS  - Monitor for seizure activity  - Administer anti-seizure medications as ordered  - Monitor neurological status  Outcome: Progressing  Goal: Remains free of injury related to seizure activity  Description:  INTERVENTIONS:  - Maintain airway, patient safety  and administer oxygen as ordered  - Monitor patient for seizure activity, document and report duration and description of seizure to MD/LIP  - If seizure occurs, turn patient to side and suction secretions as needed  - Reorient patient post seizure  - Seizure pads on all 4 side rails  - Instruct patient/family to notify RN of any seizure activity  - Instruct patient/family to call for assistance with activity based on assessment  Outcome: Progressing  Goal: Achieves maximal functionality and self care  Description: INTERVENTIONS  - Monitor swallowing and airway patency with patient fatigue and changes in neurological status  - Encourage and assist patient to increase activity and self care with guidance from PT/OT  - Encourage visually impaired, hearing impaired and aphasic patients to use assistive/communication devices  Outcome: Progressing

## 2025-01-04 NOTE — DISCHARGE SUMMARY
[facility]  Discharge Summary    Amanda Noel Brauweiler Patient Status:  inpatient    11/3/1990 MRN [id number]   Location 2217/2217-A Attending [facility]   Hosp Day # 1 PCP Ijeoma Chowdhury MD     Date of Admission: 1/3/2025    Date of Discharge: 25    Admitting Diagnosis: pregnancy  Pregnancy (HCC)    Discharge Diagnosis: PPD2 s/p     Hospital Course: The patient is a 34 year old female now  who was admitted on 1/3 at 38wk6d for NRFHTs and DFM for induction. Pitocin was initiated and AROM was done. Epidural was placed for pain management per patient request. The patient progressed to complete. S/p  on 1/3/25. Please refer to delivery note for further details. PPD#1, the patient was doing well. She was ambulating and voiding without difficulty. She was tolerating a general diet. Pain was well controlled. PPD#2, the patient continued to do well and was meeting post partum expectations. She was discharged to home and instructed to follow up in 6 weeks.     Patient instructions at discharge: Patient was counseled on signs/symptoms of postpartum infection or hemorrhage, including but not limited to fever >100.4 F, malodorous vaginal discharge, severe dizziness/lightheadedness or vaginal bleeding in excess of 1 pad/hr. She was asked to call if any of these issues occur. The patient was advised to remain on pelvic rest until her follow up visit in 6 weeks, which includes no tampons, douching or intercourse. Patient was counseled on lifting and exercise restrictions. Patient was counseled to not drive if taking narcotic pain medications.       Consultations: Anesthesia    Procedures:     Complications: n/a    Discharge Condition: Stable    Discharge Medications: Current Discharge Medication List       Medication List        START taking these medications      ibuprofen 600 MG Tabs  Commonly known as: Motrin  Take 1 tablet (600 mg total) by mouth every 6 (six) hours.            CONTINUE  taking these medications      aspirin 81 MG Tbec     Pepcid AC Maximum Strength 20 MG Tabs  Generic drug: famotidine     PRENATAL OR               Where to Get Your Medications        These medications were sent to [id number] - [place], [place] - [address] [place] [facility] [address], [phone], [phone]  [address], [facility] [address]      Phone: [phone]   ibuprofen 600 MG Tabs           Follow up Visits: Follow-up with [facility] in 6 weeks    Albina Dixon MD     Note to patient and family   The 21st Century Cures Act makes medical notes available to patients in the interest of transparency.  However, please be advised that this is a medical document.  It is intended as bawi-bs-sniy communication.  It is written and medical language may contain abbreviations or verbiage that are technical and unfamiliar.  It may appear blunt or direct.  Medical documents are intended to carry relevant information, facts as evident, and the clinical opinion of the practitioner.

## 2025-01-04 NOTE — PROGRESS NOTES
Patient admitted via wheelchair.  Left leg remains heavy; able to pivot into bed.  Bed in low position and locked. Side rails up x2.  IV saline locked.  ID bands checked with transfer RN.  Call light demonstrated and given to Pt.  Oriented to room and plan of care.  Pt. Instructed to call for staff assist to go to the bathroom. Pt. Verbalizes understanding.

## 2025-01-05 VITALS
TEMPERATURE: 98 F | OXYGEN SATURATION: 100 % | WEIGHT: 196 LBS | HEIGHT: 62 IN | DIASTOLIC BLOOD PRESSURE: 75 MMHG | RESPIRATION RATE: 16 BRPM | SYSTOLIC BLOOD PRESSURE: 113 MMHG | BODY MASS INDEX: 36.07 KG/M2 | HEART RATE: 68 BPM

## 2025-01-05 NOTE — PLAN OF CARE
Problem: POSTPARTUM  Goal: Long Term Goal:Experiences normal postpartum course  Description: INTERVENTIONS:  - Assess and monitor vital signs and lab values.  - Assess fundus and lochia.  - Provide ice/sitz baths for perineum discomfort.  - Monitor healing of incision/episiotomy/laceration, and assess for signs and symptoms of infection and hematoma.  - Assess bladder function and monitor for bladder distention.  - Provide/instruct/assist with pericare as needed.  - Provide VTE prophylaxis as needed.  - Monitor bowel function.  - Encourage ambulation and provide assistance as needed.  - Assess and monitor emotional status and provide social service/psych resources as needed.  - Utilize standard precautions and use personal protective equipment as indicated. Ensure aseptic care of all intravenous lines and invasive tubes/drains.  - Obtain immunization and exposure to communicable diseases history.  Outcome: Progressing  Goal: Optimize infant feeding at the breast  Description: INTERVENTIONS:  - Initiate breast feeding within first hour after birth.   - Monitor effectiveness of current breast feeding efforts.  - Assess support systems available to mother/family.  - Identify cultural beliefs/practices regarding lactation, letdown techniques, maternal food preferences.  - Assess mother's knowledge and previous experience with breast feeding.  - Provide information as needed about early infant feeding cues (e.g., rooting, lip smacking, sucking fingers/hand) versus late cue of crying.  - Discuss/demonstrate breast feeding aids (e.g., infant sling, nursing footstool/pillows, and breast pumps).  - Encourage mother/other family members to express feelings/concerns, and actively listen.  - Educate father/SO about benefits of breast feeding and how to manage common lactation challenges.  - Recommend avoidance of specific medications or substances incompatible with breast feeding.  - Assess and monitor for signs of nipple  pain/trauma.  - Instruct and provide assistance with proper latch.  - Review techniques for milk expression (breast pumping) and storage of breast milk. Provide pumping equipment/supplies, instructions and assistance, as needed.  - Encourage rooming-in and breast feeding on demand.  - Encourage skin-to-skin contact.  - Provide LC support as needed.  - Assess for and manage engorgement.  - Provide breast feeding education handouts and information on community breast feeding support.   Outcome: Progressing  Goal: Establishment of adequate milk supply with medication/procedure interruptions  Description: INTERVENTIONS:  - Review techniques for milk expression (breast pumping).   - Provide pumping equipment/supplies, instructions, and assistance until it is safe to breastfeed infant.  Outcome: Progressing  Goal: Appropriate maternal -  bonding  Description: INTERVENTIONS:  - Assess caregiver- interactions.  - Assess caregiver's emotional status and coping mechanisms.  - Encourage caregiver to participate in  daily care.  - Assess support systems available to mother/family.  - Provide /case management support as needed.  Outcome: Progressing     Problem: NEUROLOGICAL - ADULT  Goal: Achieves maximal functionality and self care  Description: INTERVENTIONS  - Monitor swallowing and airway patency with patient fatigue and changes in neurological status  - Encourage and assist patient to increase activity and self care with guidance from PT/OT  - Encourage visually impaired, hearing impaired and aphasic patients to use assistive/communication devices  Outcome: Completed

## 2025-01-06 ENCOUNTER — PATIENT OUTREACH (OUTPATIENT)
Dept: CASE MANAGEMENT | Age: 35
End: 2025-01-06

## 2025-01-06 NOTE — PROGRESS NOTES
Hospital follow up.    Delivering Clinician: JEANETTE UMAÑA     Delivery Date:1/3/2025     Delivery Type: Normal spontaneous vaginal delivery    Jeanette Umaña M.D.  Obstetrics & Gynecology  [facility]  [address] [place] [address] [place] [id number]  [phone]  Friday 2/14 @11:15    Confirmed with patient.    Closing encounter.

## 2025-01-07 ENCOUNTER — TELEPHONE (OUTPATIENT)
Dept: OBGYN UNIT | Facility: HOSPITAL | Age: 35
End: 2025-01-07

## 2025-01-07 NOTE — PROGRESS NOTES
01/07/25 1349   Cradle Call Follow up   Cradle call follow up date 01/07/25   Follow up needed Completed   Hypertension or Preeclampsia during pregnancy or delivery No  (has history with a previous delivery; reiinforced prreclamptic s/s and when to seek medical attn.)     Outgoing Cradle Call completed:    Mom reports that she and infant are doing well.  Mom and Baby have follow up appts scheduled.  No current complaints of PP blues or depression at this time.  Encouraged to follow up w/ MD's w/ further question/concerns.

## 2025-01-14 ENCOUNTER — TELEPHONE (OUTPATIENT)
Dept: OBGYN CLINIC | Facility: CLINIC | Age: 35
End: 2025-01-14

## 2025-01-18 NOTE — TELEPHONE ENCOUNTER
Received additional Delivery Confirmation Request from [employer]. Placed in [place] in dr Marcos's bin to review and sign

## 2025-01-20 NOTE — TELEPHONE ENCOUNTER
Form completed and signed by Jeanette Marcos MD and faxed to [facility] at [phone] as requested.

## 2025-01-30 ENCOUNTER — LAB REQUISITION (OUTPATIENT)
Dept: LAB | Facility: HOSPITAL | Age: 35
End: 2025-01-30
Payer: COMMERCIAL

## 2025-01-30 DIAGNOSIS — D48.5 NEOPLASM OF UNCERTAIN BEHAVIOR OF SKIN: ICD-10-CM

## 2025-01-30 PROCEDURE — 88305 TISSUE EXAM BY PATHOLOGIST: CPT | Performed by: DERMATOLOGY

## 2025-02-13 NOTE — PROGRESS NOTES
[facility]  Obstetrics and Gynecology    Subjective:     Chief Complaint   Patient presents with    Postpartum Care     6wk pp/Depression screening-0      Amanda Noel Brauweiler is a 34 year old now  who is postpartum day #42 from a normal spontaneous vaginal delivery.     She was induced for nonreactive NST (minimal variability and intermittent late decelerations) along with decreased fetal movement at term. Her OB history was complicated by a history of GHTN but not in the current pregnancy. Her delivery course was uncomplicated. She had no perineal lacerations however it was previously noted in the patient's G2 delivery note that given the rigidity of the perineal scar tissue from previous episiotomy x2 repairs, that in the future the patient be offered induction at 39 weeks of gestation and the delivering clinician have a low threshold to cut an episiotomy with .     She denies any gastrointestinal/genitourinary complaints.  She denies any symptoms of depression.  Infant doing well.  Nursing without difficulty.  No fever, vaginal discharge, or abdominal pain. No further lochia.      Objective:     Vitals:    25 1133   BP: 116/70   Pulse: 89   Weight: 170 lb 12.8 oz (77.5 kg)     Physical Exam:  Breasts: Declined  Abdomen: Soft, non-tender, non-distended, no masses.  Pelvic:    External genitalia- Normal, Bartholin's, urethra, skeins glands normal   Vagina- No vaginal lesions, no discharge   Cervix- No lesions, long/closed, no cervical motion tenderness   Uterus- Normal size, non-tender, no masses   Adnexa-  Non-tender, no masses  Perineum:  Normal    [place]  Depression Scale (EPDS) 0    Assessment:     Normal post partum examination.  Doing well. OK to return to normal activities/work.    Plan:     Amanda Noel Brauweiler is a 34 year old now  who is postpartum day # 42 from a normal spontaneous vaginal delivery.     #Postpartum Care   - Perineal laceration  healing appropriately   - Lochia resolved  - Feeding: breast  - Mood: good  - Patient is cleared to return to routine activity, including sexual intercourse.     #Routine gynecologic care  - Pap/HPV: 4/2024 - NILM   - Contraception: discussed options - recommended progesterone-only form. POPs sent to pharmacy and RTC for Mirena IUD placement after 10 weeks postpartum       Jeanette Marcos MD   [facility] - OBGYN      Note to patient and family   The 21st Century Cures Act makes medical notes available to patients in the interest of transparency.  However, please be advised that this is a medical document.  It is intended as qiqz-es-awkb communication.  It is written and medical language may contain abbreviations or verbiage that are technical and unfamiliar.  It may appear blunt or direct.  Medical documents are intended to carry relevant information, facts as evident, and the clinical opinion of the practitioner.

## 2025-02-14 ENCOUNTER — POSTPARTUM (OUTPATIENT)
Dept: OBGYN CLINIC | Facility: CLINIC | Age: 35
End: 2025-02-14
Payer: COMMERCIAL

## 2025-02-14 VITALS
WEIGHT: 170.81 LBS | SYSTOLIC BLOOD PRESSURE: 116 MMHG | BODY MASS INDEX: 31 KG/M2 | HEART RATE: 89 BPM | DIASTOLIC BLOOD PRESSURE: 70 MMHG

## 2025-02-14 PROCEDURE — 3078F DIAST BP <80 MM HG: CPT | Performed by: OBSTETRICS & GYNECOLOGY

## 2025-02-14 PROCEDURE — 3074F SYST BP LT 130 MM HG: CPT | Performed by: OBSTETRICS & GYNECOLOGY

## 2025-02-14 RX ORDER — ACETAMINOPHEN AND CODEINE PHOSPHATE 120; 12 MG/5ML; MG/5ML
0.35 SOLUTION ORAL DAILY
Qty: 56 TABLET | Refills: 0 | Status: SHIPPED | OUTPATIENT
Start: 2025-02-14 | End: 2025-04-11

## 2025-02-14 NOTE — PATIENT INSTRUCTIONS
Mirena IUD: [email or web address]     [email or web address].[email or web address]_[email or web address]

## 2025-02-17 ENCOUNTER — TELEPHONE (OUTPATIENT)
Dept: FAMILY MEDICINE CLINIC | Facility: CLINIC | Age: 35
End: 2025-02-17

## 2025-02-17 ENCOUNTER — MED REC SCAN ONLY (OUTPATIENT)
Dept: FAMILY MEDICINE CLINIC | Facility: CLINIC | Age: 35
End: 2025-02-17

## 2025-02-17 NOTE — TELEPHONE ENCOUNTER
Received incoming medical record request from  [employer] for DOS 02/14/2020-Present. Sent via interoffice mail to [employer], copy sent to scanning.

## 2025-03-12 ENCOUNTER — LAB REQUISITION (OUTPATIENT)
Dept: LAB | Facility: HOSPITAL | Age: 35
End: 2025-03-12
Payer: COMMERCIAL

## 2025-03-12 DIAGNOSIS — D22.62 MELANOCYTIC NEVI OF LEFT UPPER LIMB, INCLUDING SHOULDER: ICD-10-CM

## 2025-03-12 PROCEDURE — 88305 TISSUE EXAM BY PATHOLOGIST: CPT | Performed by: DERMATOLOGY

## 2025-03-24 ENCOUNTER — OFFICE VISIT (OUTPATIENT)
Dept: OBGYN CLINIC | Facility: CLINIC | Age: 35
End: 2025-03-24
Payer: COMMERCIAL

## 2025-03-24 VITALS
WEIGHT: 169.5 LBS | HEART RATE: 74 BPM | BODY MASS INDEX: 31.19 KG/M2 | DIASTOLIC BLOOD PRESSURE: 62 MMHG | HEIGHT: 62 IN | SYSTOLIC BLOOD PRESSURE: 114 MMHG

## 2025-03-24 DIAGNOSIS — Z30.430 ENCOUNTER FOR INSERTION OF INTRAUTERINE CONTRACEPTIVE DEVICE (IUD): Primary | ICD-10-CM

## 2025-03-24 DIAGNOSIS — Z01.812 PRE-PROCEDURAL LABORATORY EXAMINATION: ICD-10-CM

## 2025-03-24 LAB
CONTROL LINE PRESENT WITH A CLEAR BACKGROUND (YES/NO): YES YES/NO
KIT LOT #: NORMAL NUMERIC
PREGNANCY TEST, URINE: NEGATIVE

## 2025-03-24 NOTE — PROCEDURES
Procedure: Intrauterine device insertion - Mirena    Date of Procedure: 25    Pre-procedure diagnosis:  Encounter for contraception     Post-procedure diagnosis:   Encounter for contraction     Indications:   34 year old female  who presents for Intrauterine device insertion with Mirena for contraception.     Procedure details:  The patient was counseled on various methods of contraception. The patient requested long acting reversible contraception with Mirena Intrauterine Device. The procedure, risks, benefits and alternatives were discussed with the patient. The patient was informed of risks including but not limited to the risk of bleeding, infection, injury, improper placement, pregnancy and irregular bleeding. All questions and concerns were addressed. The patient provided verbal and written consent.     The patient was placed in supine position with legs position in stirrups. A sterile speculum was placed in the vagina and the cervix was visualized. The cervix was cleaned and prepped with betadine. Lidocaine gel followed by Allis clamp was placed on the anterior lip of the cervix. The uterus was sounded to 7 cm. The Mirena Intrauterine Device was then prepped and loaded in sterile fashion. The Mirena Intrauterine Device was then inserted through the cervical canal into the uterine cavity, deployed and the remaining device removed. The strings were visualized at the external os and cut to 2 cm. The Allis was removed. Excellent hemostasis was noted. The Mirena IUD strings were then tucked behind the cervix and the all instruments were removed from the vagina. The patient tolerated the procedure well. She was counseled on the recommendation for back up method of contraception for 7 days after placement. The patient verbalized understanding.     Disposition: Stable    Complications: None    Follow up: 2- 4 weeks     Jeanette Marcos MD   [facility] - OBGYN      Note to patient and family   The  Century  Cures Act makes medical notes available to patients in the interest of transparency.  However, please be advised that this is a medical document.  It is intended as wkuo-fp-ntcl communication.  It is written and medical language may contain abbreviations or verbiage that are technical and unfamiliar.  It may appear blunt or direct.  Medical documents are intended to carry relevant information, facts as evident, and the clinical opinion of the practitioner.

## 2025-04-03 ENCOUNTER — PATIENT MESSAGE (OUTPATIENT)
Dept: FAMILY MEDICINE CLINIC | Facility: CLINIC | Age: 35
End: 2025-04-03

## 2025-04-07 NOTE — TELEPHONE ENCOUNTER
MCM sent to patient  Patient to respond/ form  Notify me if not read by 04/09/25    Form placed in patient blue book for patient to  at  during office hours - patient to sign and need copy for our records to send to scanning

## 2025-04-10 ENCOUNTER — MED REC SCAN ONLY (OUTPATIENT)
Dept: FAMILY MEDICINE CLINIC | Facility: CLINIC | Age: 35
End: 2025-04-10

## 2025-04-11 ENCOUNTER — OFFICE VISIT (OUTPATIENT)
Dept: OBGYN CLINIC | Facility: CLINIC | Age: 35
End: 2025-04-11
Payer: COMMERCIAL

## 2025-04-11 VITALS
HEART RATE: 90 BPM | WEIGHT: 165.19 LBS | DIASTOLIC BLOOD PRESSURE: 66 MMHG | BODY MASS INDEX: 30 KG/M2 | SYSTOLIC BLOOD PRESSURE: 118 MMHG

## 2025-04-11 DIAGNOSIS — Z30.431 ENCOUNTER FOR ROUTINE CHECKING OF INTRAUTERINE CONTRACEPTIVE DEVICE (IUD): Primary | ICD-10-CM

## 2025-04-11 PROCEDURE — 3074F SYST BP LT 130 MM HG: CPT | Performed by: OBSTETRICS & GYNECOLOGY

## 2025-04-11 PROCEDURE — 3078F DIAST BP <80 MM HG: CPT | Performed by: OBSTETRICS & GYNECOLOGY

## 2025-04-11 PROCEDURE — 99214 OFFICE O/P EST MOD 30 MIN: CPT | Performed by: OBSTETRICS & GYNECOLOGY

## 2025-04-11 NOTE — PROGRESS NOTES
[facility]  Obstetrics and Gynecology  History & Physical    CC: Follow up appointment - IUD string check     Subjective:     HPI: Amanda Noel Brauweiler is a 34 year old  female here for follow up regarding her Mirena IUD on 3/24/2025.     She has some mild spotting but denies cramping. She has not yet had intercourse. She notes baby is doing well!     OB History:  OB History    Para Term  AB Living   5 3 3 0 2 3   SAB IAB Ectopic Multiple Live Births   2 0 0 0 3       Gyne History:  Menarche: 14  Period Cycle (Days): Irregular  Period Duration (Days): 5 days  Period Flow: Moderate  Use of Birth Control (if yes, specify type): Mirena IUD (25)  Hx Prior Abnormal Pap: No  Pap Date: 24  Pap Result Notes: WNL  Patient's last menstrual period was 2024 (approximate).    Pap smear history:  2024 - NILM    Denies history of STDs (non-HPV).   Currently yet to be sexually active   Birth control: Mirena IUD (3/24/2025)    Review of Systems:  General: no complaints per category. See HPI for additional information.   Breast: no complaints per category. See HPI for additional information.   Respiratory: no complaints per category. See HPI for additional information.   Cardiovascular: no complaints per category. See HPI for additional information.   GI: no complaints per category. See HPI for additional information.   : no complaints per category. See HPI for additional information.   Heme: no complaints per category. See HPI for additional information.       Objective:     Vitals:    25 1630   BP: 118/66   Pulse: 90   Weight: 165 lb 3.2 oz (74.9 kg)         Body mass index is 30.22 kg/m².    General: AAO.NAD.   CVS exam: normal peripheral perfusion  Chest: non-labored breathing, no tachypnea   Breast: deferred  Abdominal exam: soft, nontender, nondistended  Pelvic exam:   VULVA: normal appearing vulva with no masses, tenderness or lesions  PERINEUM:  normal appearing, no  lesions   URETHRAL MEATUS:  normal appearing, no lesions   VAGINA: normal appearing vagina with normal color and discharge, no lesions  CERVIX: normal appearing cervix without discharge or lesions. IUD strings x2 visualized through cerivx.   UTERUS: uterus is normal size, shape, consistency and nontender  ADNEXA: normal adnexa in size, nontender and no masses  PERIRECTAL: normal appearing, no lesions   Ext: non-tender, no edema    Assessment:     Amanda Noel Brauweiler is a 34 year old  female here for follow up regarding her Mirena IUD on 3/24/2025.       Plan:     Problem List Items Addressed This Visit    None    IUD exam  - strings visualized x2   - reviewed pain and bleeding precautions    All of the findings and plan were discussed with the patient.  She notes understanding and agrees with the plan of care.  All questions were answered to the best of my ability at this time.      RTC in 1 year for an annual exam or sooner if needed     Jeanette Marcos MD   [facility] - OBGYN      Note to patient and family   The  Century Cures Act makes medical notes available to patients in the interest of transparency.  However, please be advised that this is a medical document.  It is intended as zjmb-zp-dnck communication.  It is written and medical language may contain abbreviations or verbiage that are technical and unfamiliar.  It may appear blunt or direct.  Medical documents are intended to carry relevant information, facts as evident, and the clinical opinion of the practitioner.           This note could include assistance by Dragon voice recognition. Errors in content may be related to improper recognition by the system; efforts to review and correct have been done but errors may still exist.

## (undated) DEVICE — GYN CDS: Brand: MEDLINE INDUSTRIES, INC.

## (undated) DEVICE — CANISTER SAFETOUCH SYST DISP

## (undated) DEVICE — STERILE POLYISOPRENE POWDER-FREE SURGICAL GLOVES: Brand: PROTEXIS

## (undated) DEVICE — SUTURE CHROMIC GUT 3-0 SH

## (undated) DEVICE — SOL  .9 1000ML BTL

## (undated) DEVICE — SCD SLEEVE KNEE HI BLEND

## (undated) DEVICE — TUBING SUCTION COLLECTION SET

## (undated) DEVICE — CURRETTE 8MM STR

## (undated) NOTE — LETTER
Date: 1/28/2024    Patient Name: Amanda Noel Brauweiler          To Whom it may concern:    The above patient was seen at the [facility] for treatment of a medical condition. Please excuse her absences this week. She can return on 1/30/24 as long as she is fever free for 24hrs without the use of fever reducing medications.      Sincerely,    Moreno Sandoval NP

## (undated) NOTE — MR AVS SNAPSHOT
After Visit Summary   12/21/2020    Dodie Chao    MRN: [id number]           Visit Information     Date & Time  12/21/2020 11:30 AM Provider  Billy Gallo MD Department  [facility], [address] [address], [place] Dept.  Phone  [phone]. Enter your [employer] Activation Code exactly as it appears below. You will not need to use this code after you have completed the sign-up process. If you do not sign up before the expiration date, you must request a new code.     [employer] Activation Code: 5 non-emergency, consider your options before heading to an ER. VIDEO VISITS  Visit face-to-face with a [facility] physician or   [facility] using your mobile device or computer   using [employer].    e-VISITS  Communicate with a [facility] Physician or [facility] online.  The physician zeb

## (undated) NOTE — LETTER
Amanda Noel Brauweiler, :11/3/1990    CONSENT FOR PROCEDURE/SEDATION    1. I authorize the performance upon Amanda Noel Brauweiler  the following: Intrauterine Device Mirena    2. I authorize Dr. Jeanette Marcos MD (and whomever is designated as the doctor’s assistant), to perform the above-mentioned procedures.    3. If any unforeseen conditions arise during this procedure calling for additional  procedures, operations, or medications (including anesthesia and blood transfusion), I further request and authorize the doctor to do whatever he/she deems advisable in my interest.    4. I consent to the taking and reproduction of any photographs in the course of this procedure for professional purposes.    5. I consent to the administration of such sedation as may be considered necessary or advisable by the physician responsible for this service, with the exception of ______________________________________________________    6. I have been informed by my doctor of the nature and purpose of this procedure sedation, possible alternative methods of treatment, risk involved and possible complications.    7. If I have a Do Not Resuscitate (DNR) order in place, the physician and I (or the individual authorized to consent on my behalf) will discuss and agree as to whether the Do Not Resuscitate (DNR) order will remain in effect or will be discontinued during the performance of the procedure and the applicable recovery period. If the Do Not Resuscitate (DNR) order is discontinued and is to be reinstated following the procedure/recovery period, the physician will determine when the applicable recovery period ends for purposes of reinstating the Do Not Resuscitate (DNR) order.    Signature of Patient:_______________________________________________    Signature of person authorized to consent for patient:  _______________________________________________________________    Relationship to patient:  ____________________________________________    Witness: _________________________________________ Date:___________     Physician Signature: _______________________________ Date:___________